# Patient Record
Sex: FEMALE | Race: WHITE | Employment: OTHER | URBAN - METROPOLITAN AREA
[De-identification: names, ages, dates, MRNs, and addresses within clinical notes are randomized per-mention and may not be internally consistent; named-entity substitution may affect disease eponyms.]

---

## 2019-04-02 ENCOUNTER — OFFICE VISIT (OUTPATIENT)
Dept: FAMILY MEDICINE CLINIC | Age: 39
End: 2019-04-02

## 2019-04-02 VITALS
RESPIRATION RATE: 14 BRPM | WEIGHT: 163 LBS | SYSTOLIC BLOOD PRESSURE: 122 MMHG | HEART RATE: 92 BPM | TEMPERATURE: 98.2 F | OXYGEN SATURATION: 98 % | BODY MASS INDEX: 27.83 KG/M2 | HEIGHT: 64 IN | DIASTOLIC BLOOD PRESSURE: 64 MMHG

## 2019-04-02 DIAGNOSIS — M47.22 OSTEOARTHRITIS OF SPINE WITH RADICULOPATHY, CERVICAL REGION: Primary | ICD-10-CM

## 2019-04-02 DIAGNOSIS — M25.562 CHRONIC PAIN OF BOTH KNEES: ICD-10-CM

## 2019-04-02 DIAGNOSIS — H81.03 MENIERE'S DISEASE OF BOTH EARS: ICD-10-CM

## 2019-04-02 DIAGNOSIS — M79.641 PAIN IN BOTH HANDS: ICD-10-CM

## 2019-04-02 DIAGNOSIS — Z98.890 H/O CERVICAL SPINE SURGERY: ICD-10-CM

## 2019-04-02 DIAGNOSIS — M25.561 CHRONIC PAIN OF BOTH KNEES: ICD-10-CM

## 2019-04-02 DIAGNOSIS — M79.642 PAIN IN BOTH HANDS: ICD-10-CM

## 2019-04-02 DIAGNOSIS — G89.29 CHRONIC PAIN OF BOTH KNEES: ICD-10-CM

## 2019-04-02 DIAGNOSIS — M25.50 ARTHRALGIA, UNSPECIFIED JOINT: ICD-10-CM

## 2019-04-02 RX ORDER — CYCLOBENZAPRINE HCL 10 MG
10 TABLET ORAL
Qty: 90 TAB | Refills: 0 | Status: SHIPPED | OUTPATIENT
Start: 2019-04-02 | End: 2019-07-01 | Stop reason: SDUPTHER

## 2019-04-02 RX ORDER — MELOXICAM 15 MG/1
15 TABLET ORAL DAILY
COMMUNITY
End: 2019-09-05 | Stop reason: SDUPTHER

## 2019-04-02 RX ORDER — GUAIFENESIN AND PHENYLEPHRINE HCL 400; 10 MG/1; MG/1
TABLET ORAL
COMMUNITY
End: 2020-01-21

## 2019-04-02 RX ORDER — CYCLOBENZAPRINE HCL 10 MG
10 TABLET ORAL
COMMUNITY
End: 2019-04-02 | Stop reason: SDUPTHER

## 2019-04-02 RX ORDER — OMEPRAZOLE 40 MG/1
40 CAPSULE, DELAYED RELEASE ORAL
COMMUNITY
End: 2020-07-08 | Stop reason: SDUPTHER

## 2019-04-02 NOTE — PROGRESS NOTES
Yvonne Rosenthal is a 44 y.o. 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    Orthopedic Consult     neck, back, bilat hands/wrist, right knee       Vitals:    19 1400   BP: 122/64   Pulse: 92   Resp: 14   Temp: 98.2 °F (36.8 °C)   TempSrc: Oral   SpO2: 98%   Weight: 163 lb (73.9 kg)   Height: 5' 3.5\" (1.613 m)       Hearing/Vision:   No exam data present    Learning Assessment:     Learning Assessment 2019   PRIMARY LEARNER Patient   HIGHEST LEVEL OF EDUCATION - PRIMARY LEARNER  2 YEARS OF COLLEGE   BARRIERS PRIMARY LEARNER NONE   PRIMARY LANGUAGE ENGLISH    NEED No   LEARNER PREFERENCE PRIMARY DEMONSTRATION   ANSWERED BY patient   RELATIONSHIP SELF     Depression Screening:     3 most recent PHQ Screens 2019   Little interest or pleasure in doing things Several days   Feeling down, depressed, irritable, or hopeless Several days   Total Score PHQ 2 2     Fall Risk Assessment:   No flowsheet data found. Abuse Screening:   No flowsheet data found. Coordination of Care Questionaire:   1. Have you been to the ER, urgent care clinic since your last visit? Hospitalized since your last visit? YES; [facility] [place] [facility]    2. Have you seen or consulted any other health care providers outside of the [address] [place] [address] since your last visit? Include any pap smears or colon screening. NO    Advanced Directive:   1. Do you have an Advanced Directive? NO    2. Would you like information on Advanced Directives?  NO

## 2019-04-04 NOTE — PROGRESS NOTES
Juan Chau is a 44 y.o.  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 She states numerous referrals needed. She states she is  and new to the area and was being seen by ortho, neuro, and rheum before her move for various conditions as listed below. No changes in condition just needs to establish care for follow ups. No new c/o today would like to get referrals and come back for CPE and labs. Current Outpatient Medications   Medication Sig Dispense Refill    meloxicam (MOBIC) 15 mg tablet Take 15 mg by mouth daily.  omeprazole (PRILOSEC) 40 mg capsule Take 40 mg by mouth daily.  turmeric root extract 500 mg cap Take  by mouth.  cyclobenzaprine (FLEXERIL) 10 mg tablet Take 1 Tab by mouth nightly. 90 Tab 0     No Known Allergies    Review of Systems   Constitutional: Negative for chills and fever. Respiratory: Negative for shortness of breath. Cardiovascular: Negative for chest pain and leg swelling. Gastrointestinal: Negative for abdominal pain, constipation, diarrhea, nausea and vomiting. Musculoskeletal: Positive for back pain, joint pain, myalgias and neck pain. Negative for falls. Neurological: Positive for dizziness. Negative for tingling, tremors, sensory change, speech change, focal weakness, weakness and headaches. Objective:  Vitals:    04/02/19 1400   BP: 122/64   Pulse: 92   Resp: 14   Temp: 98.2 °F (36.8 °C)   TempSrc: Oral   SpO2: 98%   Weight: 163 lb (73.9 kg)   Height: 5' 3.5\" (1.613 m)     General:   Well-groomed, well-nourished, in no distress, pleasant, alert, appropriate    Neck:      Neck supple, no swelling, mass or tenderness or thyromegaly  Ears:  TM's wnl no fluid or erythema  Cardiovasc:   RRR,  no murmur, rubs or gallops.    Pulmonary: Lungs clear bilaterally, no wheezing, rales or rhonchi. Abdomen:   Abdomen soft, normal bowel sounds. No masses, tenderness,    rebound/rigidity or CVA tenderness. No hepatosplenomegaly. Extremities:   No erythema, deformity, edema, or TTP,  LEs warm and well-perfused. Neuro:            Reflexes and motor strength normal and symmetric. No focal deficits. MSK:   Normal full range of motion of joints, although she states ttp when moved. 5/5 muscle strength throughout. Cervical spine fused with limited ROM. Assessment/Plan:      1. Osteoarthritis of spine with radiculopathy, cervical region  Ortho spine in past would like to see neuro. - REFERRAL TO NEUROLOGY     2. H/O cervical spine surgery  Has seen ortho spine in past would like to see neuro for second opinion   - REFERRAL TO NEUROLOGY    3. Meniere's disease of both ears  Patient request referral to neuro discussed ENT she declined. States she wants Neuro   - REFERRAL TO NEUROLOGY    4. Arthralgia, unspecified joint  Prior hx of joint problems was advised to see rheumatology prior lab work up negative.   - REFERRAL TO RHEUMATOLOGY    5. Pain in both hands  Ongoing pain bilateral hands has seen ortho in past request referral back   - REFERRAL TO ORTHOPEDICS    6. Chronic pain of both knees  Ongoing knee pain was seen by ortho in past wants to establish with ortho here. - REFERRAL TO ORTHOPEDICS    I have discussed the diagnosis with the patient and the intended plan as seen in the above orders. The patient has received an after-visit summary and questions were answered concerning future plans. I have discussed medication side effects and warnings with the patient as well. I have reviewed the plan of care with the patient, accepted their input and they are in agreement with the treatment goals. Follow-up and Dispositions    · Return in about 3 months (around 7/2/2019).        More than 1/2 of this 45 minute visit was spent in counselling and coordination of care, as described above.     Latisha Recinos FNP-BC

## 2019-05-02 ENCOUNTER — HOSPITAL ENCOUNTER (OUTPATIENT)
Dept: LAB | Age: 39
Discharge: HOME | End: 2019-05-02
Payer: OTHER GOVERNMENT

## 2019-05-02 ENCOUNTER — OFFICE VISIT (OUTPATIENT)
Dept: FAMILY MEDICINE CLINIC | Age: 39
End: 2019-05-02

## 2019-05-02 VITALS
HEIGHT: 64 IN | WEIGHT: 157.2 LBS | OXYGEN SATURATION: 98 % | DIASTOLIC BLOOD PRESSURE: 78 MMHG | SYSTOLIC BLOOD PRESSURE: 100 MMHG | BODY MASS INDEX: 26.84 KG/M2 | HEART RATE: 96 BPM | RESPIRATION RATE: 17 BRPM | TEMPERATURE: 98 F

## 2019-05-02 DIAGNOSIS — Z01.419 ROUTINE GYNECOLOGICAL EXAMINATION: Primary | ICD-10-CM

## 2019-05-02 DIAGNOSIS — Z01.419 ROUTINE GYNECOLOGICAL EXAMINATION: ICD-10-CM

## 2019-05-02 PROBLEM — M25.561 CHRONIC PAIN OF BOTH KNEES: Status: ACTIVE | Noted: 2019-05-02

## 2019-05-02 PROBLEM — M79.642 PAIN IN BOTH HANDS: Status: ACTIVE | Noted: 2019-05-02

## 2019-05-02 PROBLEM — M25.50 ARTHRALGIA: Status: ACTIVE | Noted: 2019-05-02

## 2019-05-02 PROBLEM — M25.562 CHRONIC PAIN OF BOTH KNEES: Status: ACTIVE | Noted: 2019-05-02

## 2019-05-02 PROBLEM — Z98.890 H/O CERVICAL SPINE SURGERY: Status: ACTIVE | Noted: 2019-05-02

## 2019-05-02 PROBLEM — H81.03 MENIERE'S DISEASE OF BOTH EARS: Status: ACTIVE | Noted: 2019-05-02

## 2019-05-02 PROBLEM — M47.22 OSTEOARTHRITIS OF SPINE WITH RADICULOPATHY, CERVICAL REGION: Status: ACTIVE | Noted: 2019-05-02

## 2019-05-02 PROBLEM — M79.641 PAIN IN BOTH HANDS: Status: ACTIVE | Noted: 2019-05-02

## 2019-05-02 PROBLEM — G89.29 CHRONIC PAIN OF BOTH KNEES: Status: ACTIVE | Noted: 2019-05-02

## 2019-05-02 PROCEDURE — 87624 HPV HI-RISK TYP POOLED RSLT: CPT

## 2019-05-02 PROCEDURE — 87798 DETECT AGENT NOS DNA AMP: CPT

## 2019-05-02 PROCEDURE — 88142 CYTOPATH C/V THIN LAYER: CPT

## 2019-05-02 NOTE — PROGRESS NOTES
Subjective:   44 y.o. female for Well Woman Check. Patient's last menstrual period was 04/25/2019. Social History: single partner, contraception - none. Pertinent past medical hstory: no history of HTN, DVT, CAD, DM, liver disease, migraines or smoking. Current Outpatient Medications   Medication Sig Dispense Refill    meloxicam (MOBIC) 15 mg tablet Take 15 mg by mouth daily.  omeprazole (PRILOSEC) 40 mg capsule Take 40 mg by mouth daily.  turmeric root extract 500 mg cap Take  by mouth.  cyclobenzaprine (FLEXERIL) 10 mg tablet Take 1 Tab by mouth nightly. 90 Tab 0     No Known Allergies     ROS:  Feeling well. No dyspnea or chest pain on exertion. No abdominal pain, change in bowel habits, black or bloody stools. No urinary tract symptoms. GYN ROS: normal menses, no abnormal bleeding, pelvic pain or discharge, no breast pain or new or enlarging lumps on self exam. No neurological complaints. Objective:     Visit Vitals  /78 (BP 1 Location: Left arm, BP Patient Position: Sitting)   Pulse 96   Temp 98 °F (36.7 °C) (Oral)   Resp 17   Ht 5' 4.25\" (1.632 m)   Wt 157 lb 3.2 oz (71.3 kg)   LMP 04/25/2019   SpO2 98%   BMI 26.77 kg/m²     The patient appears well, alert, oriented x 3, in no distress. ENT normal.  Neck supple. No adenopathy or thyromegaly. RUDDY. Lungs are clear, good air entry, no wheezes, rhonchi or rales. S1 and S2 normal, no murmurs, regular rate and rhythm. Abdomen soft without tenderness, guarding, mass or organomegaly. Extremities show no edema, normal peripheral pulses. Neurological is normal, no focal findings.     BREAST EXAM: breasts appear normal, no suspicious masses, no skin or nipple changes or axillary nodes    PELVIC EXAM: VULVA: normal appearing vulva with no masses, tenderness or lesions, VAGINA: normal appearing vagina with normal color and discharge, no lesions, CERVIX: normal appearing cervix without discharge or lesions, UTERUS: uterus is normal size, shape, consistency and nontender, ADNEXA: normal adnexa in size, nontender and no masses, RECTAL: normal rectal, no masses    Assessment/Plan:   well woman  pap smear  return annually or prn  Encounter Diagnoses   Name Primary?  Routine gynecological examination Yes     Orders Placed This Encounter    NUSWAB VAGINITIS PLUS    PAP + HPV DNA (HIGH RISK)   .

## 2019-05-02 NOTE — PROGRESS NOTES
Jm Marinelli is a 44 y.o. female (: 1980) presenting to address:    Chief Complaint   Patient presents with    Well Woman       Vitals:    19 1408   BP: 100/78   Pulse: 96   Resp: 17   Temp: 98 °F (36.7 °C)   TempSrc: Oral   SpO2: 98%   Weight: 157 lb 3.2 oz (71.3 kg)   Height: 5' 4.25\" (1.632 m)   PainSc:   0 - No pain   LMP: 2019       Hearing/Vision:   No exam data present    Learning Assessment:     Learning Assessment 2019   PRIMARY LEARNER Patient   HIGHEST LEVEL OF EDUCATION - PRIMARY LEARNER  2 YEARS OF COLLEGE   BARRIERS PRIMARY LEARNER NONE   PRIMARY LANGUAGE ENGLISH    NEED No   LEARNER PREFERENCE PRIMARY DEMONSTRATION   ANSWERED BY patient   RELATIONSHIP SELF     Depression Screening:     3 most recent PHQ Screens 2019   Little interest or pleasure in doing things Not at all   Feeling down, depressed, irritable, or hopeless Not at all   Total Score PHQ 2 0     Fall Risk Assessment:   No flowsheet data found. Abuse Screening:     Abuse Screening Questionnaire 2019   Do you ever feel afraid of your partner? N   Are you in a relationship with someone who physically or mentally threatens you? N   Is it safe for you to go home? Y     Coordination of Care Questionaire:   1. Have you been to the ER, urgent care clinic since your last visit? Hospitalized since your last visit? No     2. Have you seen or consulted any other health care providers outside of the [address] [place] [address] since your last visit? Include any pap smears or colon screening. No, needs eye exam and has appt for ortho spine dr   Advanced Directive:   1. Do you have an Advanced Directive? No   2. Would you like information on Advanced Directives? Yes     Health Maintenance Due   Topic Date Due    Pneumococcal 0-64 years (1 of 1 - PPSV23) 1986    DTaP/Tdap/Td series (1 - Tdap) 2001    PAP AKA CERVICAL CYTOLOGY  2001     Pt requests new referral to a new rheum.

## 2019-05-06 LAB
A VAGINAE DNA VAG QL NAA+PROBE: NORMAL SCORE
BVAB2 DNA VAG QL NAA+PROBE: NORMAL SCORE
C ALBICANS DNA VAG QL NAA+PROBE: NEGATIVE
C GLABRATA DNA VAG QL NAA+PROBE: NEGATIVE
C TRACH RRNA SPEC QL NAA+PROBE: NEGATIVE
MEGA1 DNA VAG QL NAA+PROBE: NORMAL SCORE
N GONORRHOEA RRNA SPEC QL NAA+PROBE: NEGATIVE
SPECIMEN SOURCE: NORMAL
T VAGINALIS RRNA SPEC QL NAA+PROBE: NEGATIVE

## 2019-07-01 RX ORDER — CYCLOBENZAPRINE HCL 10 MG
10 TABLET ORAL
Qty: 90 TAB | Refills: 0 | Status: SHIPPED | OUTPATIENT
Start: 2019-07-01 | End: 2019-10-01 | Stop reason: SDUPTHER

## 2019-07-01 NOTE — TELEPHONE ENCOUNTER
Requested Prescriptions     Pending Prescriptions Disp Refills    cyclobenzaprine (FLEXERIL) 10 mg tablet 90 Tab 0     Sig: Take 1 Tab by mouth nightly. Patient calling to request refill on: 7.1.19      Remaining pills:   Last appt: 5.2.19  Next appt: none     Pharmacy: [facility] [address]      Pt is requesting for the medication to be sent in today if at all possible because she will be leaving for [place] tomorrow and will run out of medication while she is away. I did inform her that I could not guarantee that it would be today and she voiced understanding. Pt is requesting a call later this afternoon to update her on if her medication can be called in today or not.

## 2019-09-05 NOTE — TELEPHONE ENCOUNTER
Requested Prescriptions     Pending Prescriptions Disp Refills    meloxicam (MOBIC) 15 mg tablet       Sig: Take 1 Tab by mouth daily. Patient calling to request refill on: 9.5.19      Remaining pills: 6  Last appt: 5.2.19  Next appt: 9.9.19    Pharmacy: [address]      Patient aware of 72 hour time frame.

## 2019-09-06 RX ORDER — MELOXICAM 15 MG/1
15 TABLET ORAL DAILY
Qty: 90 TAB | Refills: 0 | Status: SHIPPED | OUTPATIENT
Start: 2019-09-06 | End: 2020-02-20

## 2019-09-09 ENCOUNTER — OFFICE VISIT (OUTPATIENT)
Dept: FAMILY MEDICINE CLINIC | Age: 39
End: 2019-09-09

## 2019-09-09 VITALS
SYSTOLIC BLOOD PRESSURE: 118 MMHG | OXYGEN SATURATION: 99 % | DIASTOLIC BLOOD PRESSURE: 83 MMHG | WEIGHT: 157.4 LBS | BODY MASS INDEX: 26.87 KG/M2 | RESPIRATION RATE: 18 BRPM | TEMPERATURE: 97.1 F | HEART RATE: 76 BPM | HEIGHT: 64 IN

## 2019-09-09 DIAGNOSIS — R53.82 CHRONIC FATIGUE: Primary | ICD-10-CM

## 2019-09-09 DIAGNOSIS — H81.03 MENIERE'S DISEASE OF BOTH EARS: ICD-10-CM

## 2019-09-09 DIAGNOSIS — R06.02 SOB (SHORTNESS OF BREATH): ICD-10-CM

## 2019-09-09 NOTE — PATIENT INSTRUCTIONS
Shortness of Breath: Care Instructions  Your Care Instructions  Shortness of breath has many causes. Sometimes conditions such as anxiety can lead to shortness of breath. Some people get mild shortness of breath when they exercise. Trouble breathing also can be a symptom of a serious problem, such as asthma, lung disease, emphysema, heart problems, and pneumonia. If your shortness of breath continues, you may need tests and treatment. Watch for any changes in your breathing and other symptoms. Follow-up care is a key part of your treatment and safety. Be sure to make and go to all appointments, and call your doctor if you are having problems. It's also a good idea to know your test results and keep a list of the medicines you take. How can you care for yourself at home? · Do not smoke or allow others to smoke around you. If you need help quitting, talk to your doctor about stop-smoking programs and medicines. These can increase your chances of quitting for good. · Get plenty of rest and sleep. · Take your medicines exactly as prescribed. Call your doctor if you think you are having a problem with your medicine. · Find healthy ways to deal with stress. ? Exercise daily. ? Get plenty of sleep. ? Eat regularly and well. When should you call for help? Call 911 anytime you think you may need emergency care. For example, call if:    · You have severe shortness of breath.     · You have symptoms of a heart attack. These may include:  ? Chest pain or pressure, or a strange feeling in the chest.  ? Sweating. ? Shortness of breath. ? Nausea or vomiting. ? Pain, pressure, or a strange feeling in the back, neck, jaw, or upper belly or in one or both shoulders or arms. ? Lightheadedness or sudden weakness. ? A fast or irregular heartbeat. After you call 911, the  may tell you to chew 1 adult-strength or 2 to 4 low-dose aspirin. Wait for an ambulance.  Do not try to drive yourself.    Call your doctor now or seek immediate medical care if:    · Your shortness of breath gets worse or you start to wheeze. Wheezing is a high-pitched sound when you breathe.     · You wake up at night out of breath or have to prop your head up on several pillows to breathe.     · You are short of breath after only light activity or while at rest.    Watch closely for changes in your health, and be sure to contact your doctor if:    · You do not get better over the next 1 to 2 days. Where can you learn more? Go to http://bar-glenda.info/. Enter S780 in the search box to learn more about \"Shortness of Breath: Care Instructions. \"  Current as of: September 5, 2018  Content Version: 12.1  © 5140-7614 [employer], [employer]. Care instructions adapted under license by [employer] (which disclaims liability or warranty for this information). If you have questions about a medical condition or this instruction, always ask your healthcare professional. [address] any warranty or liability for your use of this information.

## 2019-09-15 NOTE — PROGRESS NOTES
Jamison Singleton is a 44 y.o.  female and presents with    Chief Complaint   Patient presents with    Shortness of Breath    Fatigue         Subjective:  Patient presents with c/o new onset sob with walking and increased fatigue in past 2-3 weeks. Patient states she also had an ED visit in dec 2018 that she states was never resolved. She states she was supposed to see Neuro and have MRI however when she went to neuro they did not think she needed further work up. Patient is concerned she has an undiagnosed issue but she is vague with her symptoms. She had a clear CT in 2018, and labs have all been fine. Patient also with hx of meniere's and did not see ENT. Would like referral today. Current Outpatient Medications   Medication Sig Dispense Refill    meloxicam (MOBIC) 15 mg tablet Take 1 Tab by mouth daily. 90 Tab 0    cyclobenzaprine (FLEXERIL) 10 mg tablet Take 1 Tab by mouth nightly. 90 Tab 0    omeprazole (PRILOSEC) 40 mg capsule Take 40 mg by mouth daily.  turmeric root extract 500 mg cap Take  by mouth. No Known Allergies    Review of Systems   Constitutional: Positive for malaise/fatigue. Negative for chills and fever. Eyes: Negative for blurred vision. Respiratory: Positive for shortness of breath. Negative for cough, hemoptysis, sputum production and wheezing. Cardiovascular: Negative for chest pain and leg swelling. Gastrointestinal: Negative for abdominal pain, constipation, diarrhea, nausea and vomiting. Genitourinary: Negative. Musculoskeletal: Negative. Skin: Negative. Neurological: Negative for headaches.          Objective:  Vitals:    09/09/19 1338   BP: 118/83   Pulse: 76   Resp: 18   Temp: 97.1 °F (36.2 °C)   TempSrc: Oral   SpO2: 99%   Weight: 157 lb 6.4 oz (71.4 kg)   Height: 5' 4.25\" (1.632 m)   PF: 450 L/min   PainSc:   0 - No pain   LMP: 08/26/2019     General:   Well-groomed, well-nourished, in no distress, pleasant, alert, appropriate    Cardiovasc:   RRR, no murmur, rubs or gallops. Pulmonary:    Lungs clear bilaterally, no wheezing, rales or rhonchi. Abdomen:   Abdomen soft, normal bowel sounds. No masses, tenderness,    rebound/rigidity or CVA tenderness. No hepatosplenomegaly. Extremities:   No erythema, deformity, edema, or TTP,  LEs warm and well-perfused. Neuro:            Reflexes and motor strength normal and symmetric. No focal deficits. Assessment/Plan:      1. SOB (shortness of breath)  At request cxr today and ekg  - XR CHEST PA LAT; Future  - AMB POC EKG ROUTINE W/ 12 LEADS, INTER & REP    2. Chronic fatigue  - XR CHEST PA LAT; Future  - AMB POC EKG ROUTINE W/ 12 LEADS, INTER & REP    3. Meniere's disease of both ears  - REFERRAL TO ENT-OTOLARYNGOLOGY    Will call patient with results reassurance provided on CT from last year advised she can request second opinion with Neuro, all vitals are wnl today. Discussed allergy season hx of georgiana and possible need for tx plan, she declines today states that allergies are not the problem. I have discussed the diagnosis with the patient and the intended plan as seen in the above orders. The patient has received an after-visit summary and questions were answered concerning future plans. I have discussed medication side effects and warnings with the patient as well. I have reviewed the plan of care with the patient, accepted their input and they are in agreement with the treatment goals. More than 1/2 of this 15 minute visit was spent face to face in counselling and coordination of care, as described above. This document may have been created with the aid of dictation software. Text may contain errors, particularly phonetic errors.      Anuj Rodriguez [facility]

## 2019-09-30 RX ORDER — CYCLOBENZAPRINE HCL 10 MG
10 TABLET ORAL
Qty: 90 TAB | Refills: 0 | Status: CANCELLED | OUTPATIENT
Start: 2019-09-30

## 2019-09-30 NOTE — TELEPHONE ENCOUNTER
Requested Prescriptions     Pending Prescriptions Disp Refills    cyclobenzaprine (FLEXERIL) 10 mg tablet 90 Tab 0     Sig: Take 1 Tab by mouth nightly. Patient calling to request refill on: 9.30.19      Remaining pills:   Last appt: 9/9/19  Next appt: none     Pharmacy: [address]       Patient aware of 72 hour time frame. Pt will be leaving this Saturday to be out of town for 2x weeks and will run out before she will be back. Requesting a call when the medication has been sent.

## 2019-10-01 ENCOUNTER — OFFICE VISIT (OUTPATIENT)
Dept: FAMILY MEDICINE CLINIC | Age: 39
End: 2019-10-01

## 2019-10-01 VITALS
WEIGHT: 159 LBS | HEART RATE: 97 BPM | HEIGHT: 64 IN | BODY MASS INDEX: 27.14 KG/M2 | RESPIRATION RATE: 17 BRPM | TEMPERATURE: 97.1 F | SYSTOLIC BLOOD PRESSURE: 112 MMHG | OXYGEN SATURATION: 99 % | DIASTOLIC BLOOD PRESSURE: 77 MMHG

## 2019-10-01 DIAGNOSIS — H10.31 ACUTE CONJUNCTIVITIS OF RIGHT EYE, UNSPECIFIED ACUTE CONJUNCTIVITIS TYPE: Primary | ICD-10-CM

## 2019-10-01 RX ORDER — OFLOXACIN 3 MG/ML
2 SOLUTION/ DROPS OPHTHALMIC 4 TIMES DAILY
Qty: 5 ML | Refills: 0 | Status: SHIPPED | OUTPATIENT
Start: 2019-10-01 | End: 2019-10-06

## 2019-10-01 RX ORDER — CYCLOBENZAPRINE HCL 10 MG
10 TABLET ORAL
Qty: 30 TAB | Refills: 0 | Status: SHIPPED | OUTPATIENT
Start: 2019-10-01 | End: 2019-11-04 | Stop reason: SDUPTHER

## 2019-10-01 NOTE — PROGRESS NOTES
Chief Complaint   Patient presents with    Eye Burn    Itchy Eye       Assessment/Plan  1. Acute conjunctivitis of right eye, unspecified acute conjunctivitis type  -will treat with ofloxacin. Could be allergic conjunctivitis. Advised to avoid contact lens use til cleared. - ofloxacin (FLOXIN) 0.3 % ophthalmic solution; Administer 2 Drops to both eyes four (4) times daily for 5 days. Dispense: 5 mL; Refill: 0      The plan was discussed with the patient. The patient verbalized understanding and is in agreement with the plan. All medication potential side effects were discussed with the patient. SUBJECTIVE:   Gloria Saenz is a 44 y.o. female patient of TAMMY Sheets who complains of 1 week h/o L eye irritation and redness. +itchy. In morning there is crusting. No h/o allergies. No eye pain. No foreign body sensation. Wears (monthly) contacts and continues to wear them. No recent URI. Review of Systems - ENT ROS: negative  Respiratory ROS: no cough, shortness of breath, or wheezing  Cardiovascular ROS: no chest pain or dyspnea on exertion  Gastrointestinal ROS: no abdominal pain, change in bowel habits, or black or bloody stools  negative  Musculoskeletal ROS: negative  Neurological ROS: negative    Physical Examination:   Visit Vitals  /77 (BP 1 Location: Left arm, BP Patient Position: Sitting)   Pulse 97   Temp 97.1 °F (36.2 °C) (Oral)   Resp 17   Ht 5' 4\" (1.626 m)   Wt 159 lb (72.1 kg)   LMP 09/17/2019   SpO2 99%   BMI 27.29 kg/m²       Constitutional: Well developed, nourished, no distress, alert   HENT: Exterior ears and tympanic membranes normal bilaterally. Supple neck. No thyromegaly or lymphadenopathy. Oropharynx clear and moist mucous membranes. Eyes: Conjunctiva normal. PERRL. CV: S1, S2.  RRR. No murmurs/rubs. Pulm: No abnormalities on inspection. Clear to auscultation bilaterally. No wheezing/rhonchi. Normal effort.              Tad Obrien MD

## 2019-10-01 NOTE — PROGRESS NOTES
Paige Euceda is a 44 y.o. female presents to office for left eye itchy, red and burns for about a week    Medication list has been reviewed with Paige Euceda and updated as of today's date     Health Maintenance items with a due date reviewed with patient:  Health Maintenance Due   Topic Date Due    Pneumococcal 0-64 years (1 of 1 - PPSV23) 02/14/1986    DTaP/Tdap/Td series (1 - Tdap) 02/14/2001    Influenza Age 9 to Adult  08/01/2019

## 2019-10-03 RX ORDER — CYCLOBENZAPRINE HCL 10 MG
10 TABLET ORAL
Qty: 30 TAB | Refills: 0 | Status: SHIPPED | OUTPATIENT
Start: 2019-10-03 | End: 2019-12-06 | Stop reason: ALTCHOICE

## 2019-11-04 NOTE — TELEPHONE ENCOUNTER
Requested Prescriptions     Pending Prescriptions Disp Refills    cyclobenzaprine (FLEXERIL) 10 mg tablet 30 Tab 0     Sig: Take 1 Tab by mouth nightly.

## 2019-11-05 RX ORDER — CYCLOBENZAPRINE HCL 10 MG
10 TABLET ORAL
Qty: 30 TAB | Refills: 0 | Status: SHIPPED | OUTPATIENT
Start: 2019-11-05 | End: 2019-12-06 | Stop reason: ALTCHOICE

## 2019-12-06 ENCOUNTER — OFFICE VISIT (OUTPATIENT)
Dept: FAMILY MEDICINE CLINIC | Age: 39
End: 2019-12-06

## 2019-12-06 VITALS
DIASTOLIC BLOOD PRESSURE: 88 MMHG | TEMPERATURE: 97.5 F | HEART RATE: 87 BPM | RESPIRATION RATE: 18 BRPM | BODY MASS INDEX: 27.29 KG/M2 | HEIGHT: 64 IN | SYSTOLIC BLOOD PRESSURE: 121 MMHG | OXYGEN SATURATION: 92 %

## 2019-12-06 DIAGNOSIS — H10.31 ACUTE CONJUNCTIVITIS OF RIGHT EYE, UNSPECIFIED ACUTE CONJUNCTIVITIS TYPE: Primary | ICD-10-CM

## 2019-12-06 DIAGNOSIS — H04.301 INFECTION OF RIGHT TEAR DUCT: ICD-10-CM

## 2019-12-06 DIAGNOSIS — F51.01 PRIMARY INSOMNIA: ICD-10-CM

## 2019-12-06 RX ORDER — DOXEPIN HYDROCHLORIDE 25 MG/1
50 CAPSULE ORAL
Qty: 60 CAP | Refills: 2 | Status: SHIPPED | OUTPATIENT
Start: 2019-12-06 | End: 2020-03-20 | Stop reason: SDUPTHER

## 2019-12-06 RX ORDER — POLYMYXIN B SULFATE AND TRIMETHOPRIM 1; 10000 MG/ML; [USP'U]/ML
1 SOLUTION OPHTHALMIC EVERY 4 HOURS
Qty: 1 BOTTLE | Refills: 0 | Status: SHIPPED | OUTPATIENT
Start: 2019-12-06 | End: 2019-12-11

## 2019-12-06 RX ORDER — DOXEPIN HYDROCHLORIDE 25 MG/1
2 CAPSULE ORAL
COMMUNITY
End: 2019-12-06 | Stop reason: SDUPTHER

## 2019-12-06 RX ORDER — CYCLOBENZAPRINE HCL 10 MG
10 TABLET ORAL
Qty: 30 TAB | Refills: 2 | Status: SHIPPED | OUTPATIENT
Start: 2019-12-06 | End: 2020-01-07 | Stop reason: SDUPTHER

## 2019-12-06 NOTE — PROGRESS NOTES
Maeve Jones is a 44 y.o. female (: 1980) presenting to address:    Chief Complaint   Patient presents with   [address]    Referral Request    Medication Refill       Vitals:    19 1458   BP: 121/88   Pulse: 87   Resp: 18   Temp: 97.5 °F (36.4 °C)   TempSrc: Oral   SpO2: 92%   Height: 5' 4\" (1.626 m)   PainSc:   0 - No pain   LMP: 2019       Hearing/Vision:   No exam data present    Learning Assessment:     Learning Assessment 2019   PRIMARY LEARNER Patient   HIGHEST LEVEL OF EDUCATION - PRIMARY LEARNER  2 YEARS OF COLLEGE   BARRIERS PRIMARY LEARNER NONE   PRIMARY LANGUAGE ENGLISH    NEED No   LEARNER PREFERENCE PRIMARY DEMONSTRATION   ANSWERED BY patient   RELATIONSHIP SELF     Depression Screening:     3 most recent PHQ Screens 2019   Little interest or pleasure in doing things Several days   Feeling down, depressed, irritable, or hopeless Several days   Total Score PHQ 2 2     Fall Risk Assessment:   No flowsheet data found. Abuse Screening:     Abuse Screening Questionnaire 2019   Do you ever feel afraid of your partner? N   Are you in a relationship with someone who physically or mentally threatens you? N   Is it safe for you to go home? Y     Coordination of Care Questionaire:   1. Have you been to the ER, urgent care clinic since your last visit? Hospitalized since your last visit? NO    2. Have you seen or consulted any other health care providers outside of the [address] [place] [address] since your last visit? Include any pap smears or colon screening. NO    Advanced Directive:   1. Do you have an Advanced Directive? NO    2. Would you like information on Advanced Directives?  NO

## 2019-12-18 NOTE — PROGRESS NOTES
Subjective:   Paige Euceda is a 44 y.o. female who presents for evaluation of redness. She has noticed the above symptoms in the right eye for several days. Onset was sudden. Symptoms have included discharge, itching. Patient denies blurred vision, visual field deficit, photophobia. There is a history of contact lens use. Patient also has tear duct issue and needs referral to ophthalmology as her tear duct gets clogged. Other concerns patient needing refill for her insomnia medication    Review of Systems  wears contact lenses    Objective:     Visit Vitals  /88 (BP 1 Location: Right arm, BP Patient Position: Sitting)   Pulse 87   Temp 97.5 °F (36.4 °C) (Oral)   Resp 18   Ht 5' 4\" (1.626 m)   LMP 11/13/2019   SpO2 92%   BMI 27.29 kg/m²                 General: alert, cooperative, mild distress, appears stated age   Eyes:  positive findings: conjunctivae: 1+ bacterial conjunctivitis   Vision: Not performed   Fluorescein:  not done     Assessment/Plan:     acute conjunctivitis    1. Discussed the diagnosis and proper care of conjunctivitis. Stressed household hygiene. 2. Ophthalmic drops per orders. 3. Antibiotics per orders. 4. Warm compress to eye(s). 5. Local eye care discussed. 6. Analgesics as needed. 7. Patient instructions: contagiousness precautions  8. Risks and side effects of medications was discussed. 9. Pt advised to read written information provided by the pharmacist: yes  10. Followup as needed. Encounter Diagnoses   Name Primary?  Acute conjunctivitis of right eye, unspecified acute conjunctivitis type Yes    Primary insomnia     Infection of right tear duct      Orders Placed This Encounter    REFERRAL TO OPHTHALMOLOGY    DISCONTD: doxepin (SINEQUAN) 25 mg capsule    cyclobenzaprine (FLEXERIL) 10 mg tablet    doxepin (SINEQUAN) 25 mg capsule    trimethoprim-polymyxin b (POLYTRIM) ophthalmic solution   .   Medications refilled, Polytrim for conjunctivitis, ophthalmology referral for tear duct issue.   rtc as needed

## 2020-01-07 RX ORDER — CYCLOBENZAPRINE HCL 10 MG
10 TABLET ORAL
Qty: 30 TAB | Refills: 2 | Status: SHIPPED | OUTPATIENT
Start: 2020-01-07 | End: 2020-03-06 | Stop reason: SDUPTHER

## 2020-01-07 NOTE — TELEPHONE ENCOUNTER
Patient request medication refill. Requested Prescriptions     Pending Prescriptions Disp Refills    cyclobenzaprine (FLEXERIL) 10 mg tablet 30 Tab 2     Sig: Take 1 Tab by mouth three (3) times daily as needed for Muscle Spasm(s).

## 2020-01-21 ENCOUNTER — OFFICE VISIT (OUTPATIENT)
Dept: FAMILY MEDICINE CLINIC | Age: 40
End: 2020-01-21

## 2020-01-21 VITALS
TEMPERATURE: 97.9 F | SYSTOLIC BLOOD PRESSURE: 110 MMHG | RESPIRATION RATE: 16 BRPM | OXYGEN SATURATION: 97 % | HEART RATE: 92 BPM | HEIGHT: 64 IN | DIASTOLIC BLOOD PRESSURE: 79 MMHG | BODY MASS INDEX: 27.29 KG/M2

## 2020-01-21 DIAGNOSIS — Z98.890 H/O CERVICAL SPINE SURGERY: ICD-10-CM

## 2020-01-21 DIAGNOSIS — R23.2 HOT FLASHES: ICD-10-CM

## 2020-01-21 DIAGNOSIS — Z23 ENCOUNTER FOR IMMUNIZATION: ICD-10-CM

## 2020-01-21 DIAGNOSIS — Z13.21 ENCOUNTER FOR VITAMIN DEFICIENCY SCREENING: ICD-10-CM

## 2020-01-21 DIAGNOSIS — M79.641 PAIN IN BOTH HANDS: ICD-10-CM

## 2020-01-21 DIAGNOSIS — R10.11 RUQ PAIN: ICD-10-CM

## 2020-01-21 DIAGNOSIS — M47.22 OSTEOARTHRITIS OF SPINE WITH RADICULOPATHY, CERVICAL REGION: ICD-10-CM

## 2020-01-21 DIAGNOSIS — Z98.890 S/P DISKECTOMY: ICD-10-CM

## 2020-01-21 DIAGNOSIS — Z00.00 ROUTINE GENERAL MEDICAL EXAMINATION AT A HEALTH CARE FACILITY: Primary | ICD-10-CM

## 2020-01-21 DIAGNOSIS — M79.642 PAIN IN BOTH HANDS: ICD-10-CM

## 2020-01-21 PROBLEM — M54.9 CHRONIC BACK PAIN: Status: ACTIVE | Noted: 2020-01-21

## 2020-01-21 PROBLEM — G89.29 CHRONIC BACK PAIN: Status: ACTIVE | Noted: 2020-01-21

## 2020-01-21 NOTE — PROGRESS NOTES
Yvonne Rosenthal is a 44 y.o. female (: 1980) presenting to address:  Patient declines influenza vaccine at this time. Chief Complaint   Patient presents with    Transfer Of Care       Vitals:    20 1300   BP: 110/79   Pulse: 92   Resp: 16   Temp: 97.9 °F (36.6 °C)   TempSrc: Oral   SpO2: 97%   Height: 5' 4\" (1.626 m)   PainSc:   3   PainLoc: Rib Cage   LMP: 2019       Hearing/Vision:   No exam data present    Learning Assessment:     Learning Assessment 2019   PRIMARY LEARNER Patient   HIGHEST LEVEL OF EDUCATION - PRIMARY LEARNER  2 YEARS OF COLLEGE   BARRIERS PRIMARY LEARNER NONE   PRIMARY LANGUAGE ENGLISH    NEED No   LEARNER PREFERENCE PRIMARY DEMONSTRATION   ANSWERED BY patient   RELATIONSHIP SELF     Depression Screening:     3 most recent PHQ Screens 2020   PHQ Not Done Active Diagnosis of Depression or Bipolar Disorder   Little interest or pleasure in doing things -   Feeling down, depressed, irritable, or hopeless -   Total Score PHQ 2 -     Fall Risk Assessment:   No flowsheet data found. Abuse Screening:     Abuse Screening Questionnaire 2019   Do you ever feel afraid of your partner? N   Are you in a relationship with someone who physically or mentally threatens you? N   Is it safe for you to go home? Y     Coordination of Care Questionaire:   1. Have you been to the ER, urgent care clinic since your last visit? Hospitalized since your last visit? NO    2. Have you seen or consulted any other health care providers outside of the [address] [place] [address] since your last visit? Include any pap smears or colon screening. YES  Ophthalmologist about two weeks ago for lacrimal stenosis. Advanced Directive:   1. Do you have an Advanced Directive? NO    2. Would you like information on Advanced Directives?  NO

## 2020-01-21 NOTE — PROGRESS NOTES
HISTORY OF PRESENT ILLNESS  Dylan Beckford is a 44 y.o. female. HPI  New pt to me, chart reviewed today  S/p Diskectomy C5-6 years ago, has right side cervical radiculopathy, seen by Neurology last May 2019, she was referred to PT and given Rx to start effexor but she dis not take it, stated that she \"does not wants any more meds\", she is only using flexeril qhs now, ants refer to Orthopedics  C/o B/L hands pain, chronic, referred to Dr Kaylene Roman last year but not seen !, wants new referral placed  Seen by ENT for her Meniere's disease, stated that they ordered MRI of the head  C/o hot flashes on/off, wants ref to Gynecology  C/o RUQ abd pain on/off, she had cholecystectomy years ago    Review of Systems   Constitutional: Negative for chills and fever. HENT: Negative for ear pain. Gastrointestinal: Negative for nausea and vomiting. Musculoskeletal: Positive for back pain and neck pain. Negative for myalgias. Neurological: Negative for focal weakness and headaches. Physical Exam  Vitals signs reviewed. Constitutional:       General: She is not in acute distress. Appearance: She is well-developed. HENT:      Head: Normocephalic and atraumatic. Right Ear: Tympanic membrane normal.      Left Ear: Tympanic membrane normal.   Eyes:      Pupils: Pupils are equal, round, and reactive to light. Neck:      Musculoskeletal: Normal range of motion and neck supple. Thyroid: No thyromegaly. Vascular: No JVD. Cardiovascular:      Rate and Rhythm: Normal rate and regular rhythm. Heart sounds: Normal heart sounds. No murmur. Pulmonary:      Effort: Pulmonary effort is normal.      Breath sounds: Normal breath sounds. No wheezing or rales. Abdominal:      General: Bowel sounds are normal.      Palpations: Abdomen is soft. There is no mass. Tenderness: There is tenderness (minimal TTP) in the right upper quadrant. There is no guarding or rebound. Negative signs include Guerrero's sign. Musculoskeletal:      Comments: Spine and hands TTP   Lymphadenopathy:      Cervical: No cervical adenopathy. Skin:     General: Skin is warm and dry. Findings: No rash. Neurological:      Mental Status: She is alert and oriented to person, place, and time. Psychiatric:         Behavior: Behavior normal.         Judgment: Judgment normal.         ASSESSMENT and PLAN  Diagnoses and all orders for this visit:    1. Routine general medical examination at a health care facility  -     LIPID PANEL; Future  -     CBC WITH AUTOMATED DIFF; Future  -     METABOLIC PANEL, COMPREHENSIVE; Future  -     TSH 3RD GENERATION; Future  -     HEMOGLOBIN A1C WITH EAG; Future    2. Encounter for vitamin deficiency screening  -     VITAMIN D, 25 HYDROXY; Future    3. Encounter for immunization    4. H/O cervical spine surgery  -     REFERRAL TO ORTHOPEDICS    5. Osteoarthritis of spine with radiculopathy, cervical region  -     REFERRAL TO ORTHOPEDICS    6. Pain in both hands  -     REFERRAL TO ORTHOPEDICS    7. S/P diskectomy  -     REFERRAL TO ORTHOPEDICS    8. Hot flashes  -     REFERRAL TO GYNECOLOGY    9. RUQ pain  -     US ABD LTD;  Future

## 2020-01-22 DIAGNOSIS — E78.2 MIXED HYPERLIPIDEMIA: Primary | ICD-10-CM

## 2020-01-22 DIAGNOSIS — G89.29 CHRONIC BACK PAIN, UNSPECIFIED BACK LOCATION, UNSPECIFIED BACK PAIN LATERALITY: ICD-10-CM

## 2020-01-22 DIAGNOSIS — E55.9 VITAMIN D DEFICIENCY: ICD-10-CM

## 2020-01-22 DIAGNOSIS — Z98.890 S/P DISKECTOMY: ICD-10-CM

## 2020-01-22 DIAGNOSIS — M47.22 OSTEOARTHRITIS OF SPINE WITH RADICULOPATHY, CERVICAL REGION: Primary | ICD-10-CM

## 2020-01-22 DIAGNOSIS — M54.9 CHRONIC BACK PAIN, UNSPECIFIED BACK LOCATION, UNSPECIFIED BACK PAIN LATERALITY: ICD-10-CM

## 2020-01-22 LAB
25(OH)D3+25(OH)D2 SERPL-MCNC: 19.4 NG/ML (ref 30–100)
ALBUMIN SERPL-MCNC: 4.6 G/DL (ref 3.8–4.8)
ALBUMIN/GLOB SERPL: 1.6 {RATIO} (ref 1.2–2.2)
ALP SERPL-CCNC: 93 IU/L (ref 39–117)
ALT SERPL-CCNC: 64 IU/L (ref 0–32)
AST SERPL-CCNC: 30 IU/L (ref 0–40)
BASOPHILS # BLD AUTO: 0 X10E3/UL (ref 0–0.2)
BASOPHILS NFR BLD AUTO: 1 %
BILIRUB SERPL-MCNC: 0.2 MG/DL (ref 0–1.2)
BUN SERPL-MCNC: 7 MG/DL (ref 6–20)
BUN/CREAT SERPL: 11 (ref 9–23)
CALCIUM SERPL-MCNC: 9.6 MG/DL (ref 8.7–10.2)
CHLORIDE SERPL-SCNC: 99 MMOL/L (ref 96–106)
CHOLEST SERPL-MCNC: 235 MG/DL (ref 100–199)
CO2 SERPL-SCNC: 24 MMOL/L (ref 20–29)
CREAT SERPL-MCNC: 0.63 MG/DL (ref 0.57–1)
EOSINOPHIL # BLD AUTO: 0.1 X10E3/UL (ref 0–0.4)
EOSINOPHIL NFR BLD AUTO: 2 %
ERYTHROCYTE [DISTWIDTH] IN BLOOD BY AUTOMATED COUNT: 13.3 % (ref 11.7–15.4)
EST. AVERAGE GLUCOSE BLD GHB EST-MCNC: 111 MG/DL
GLOBULIN SER CALC-MCNC: 2.9 G/DL (ref 1.5–4.5)
GLUCOSE SERPL-MCNC: 88 MG/DL (ref 65–99)
HBA1C MFR BLD: 5.5 % (ref 4.8–5.6)
HCT VFR BLD AUTO: 40.4 % (ref 34–46.6)
HDLC SERPL-MCNC: 45 MG/DL
HGB BLD-MCNC: 13.8 G/DL (ref 11.1–15.9)
IMM GRANULOCYTES # BLD AUTO: 0 X10E3/UL (ref 0–0.1)
IMM GRANULOCYTES NFR BLD AUTO: 0 %
INTERPRETATION, 910389: NORMAL
LDLC SERPL CALC-MCNC: 121 MG/DL (ref 0–99)
LYMPHOCYTES # BLD AUTO: 2.1 X10E3/UL (ref 0.7–3.1)
LYMPHOCYTES NFR BLD AUTO: 28 %
MCH RBC QN AUTO: 30.4 PG (ref 26.6–33)
MCHC RBC AUTO-ENTMCNC: 34.2 G/DL (ref 31.5–35.7)
MCV RBC AUTO: 89 FL (ref 79–97)
MONOCYTES # BLD AUTO: 0.4 X10E3/UL (ref 0.1–0.9)
MONOCYTES NFR BLD AUTO: 6 %
NEUTROPHILS # BLD AUTO: 4.8 X10E3/UL (ref 1.4–7)
NEUTROPHILS NFR BLD AUTO: 63 %
PLATELET # BLD AUTO: 313 X10E3/UL (ref 150–450)
POTASSIUM SERPL-SCNC: 4.3 MMOL/L (ref 3.5–5.2)
PROT SERPL-MCNC: 7.5 G/DL (ref 6–8.5)
RBC # BLD AUTO: 4.54 X10E6/UL (ref 3.77–5.28)
SODIUM SERPL-SCNC: 137 MMOL/L (ref 134–144)
TRIGL SERPL-MCNC: 344 MG/DL (ref 0–149)
TSH SERPL DL<=0.005 MIU/L-ACNC: 1.93 UIU/ML (ref 0.45–4.5)
VLDLC SERPL CALC-MCNC: 69 MG/DL (ref 5–40)
WBC # BLD AUTO: 7.5 X10E3/UL (ref 3.4–10.8)

## 2020-01-22 RX ORDER — ERGOCALCIFEROL 1.25 MG/1
50000 CAPSULE ORAL
Qty: 12 CAP | Refills: 0 | Status: SHIPPED | OUTPATIENT
Start: 2020-01-22 | End: 2021-05-24

## 2020-01-22 NOTE — PROGRESS NOTES
Vit d is low, start vit D Rx, one capsule weekly, repeat the level in 3 months, Rx sent to [facility]    Cholesterol and TG are elevated, need low cholesterol/low fat diet, and repeat labs in 3 months  Labs ordered, please repeat in 3 mos  Glucose and thyroid are normal

## 2020-01-23 ENCOUNTER — TELEPHONE (OUTPATIENT)
Dept: FAMILY MEDICINE CLINIC | Age: 40
End: 2020-01-23

## 2020-01-23 ENCOUNTER — DOCUMENTATION ONLY (OUTPATIENT)
Dept: FAMILY MEDICINE CLINIC | Age: 40
End: 2020-01-23

## 2020-01-23 NOTE — PROGRESS NOTES
Pt returned call for lab results, nursing unavailable     Lab not from Dr. Anup Neal read to pt. Vit d is low, start vit D Rx, one capsule weekly, repeat the level in 3 months, Rx sent to [facility]     Cholesterol and TG are elevated, need low cholesterol/low fat diet, and repeat labs in 3 months  Labs ordered, please repeat in 3 mos  Glucose and thyroid are normal      Electronically signed by Horacio Brothers MD at 01/22/20 6522      No follow up questions.

## 2020-01-23 NOTE — TELEPHONE ENCOUNTER
Pt called back in regards to labs she would like additional in formation in regards to her liver enzymes. Please advise.

## 2020-01-24 ENCOUNTER — TELEPHONE (OUTPATIENT)
Dept: FAMILY MEDICINE CLINIC | Age: 40
End: 2020-01-24

## 2020-01-24 NOTE — TELEPHONE ENCOUNTER
Spoke with patient and advised her to get US scheduled that Dr Svetlana Quezada had ordered. She is concerned with her liver enzymes and would like a hepatic panel ordered, but patient agreed to wait until US is resulted.

## 2020-01-24 NOTE — TELEPHONE ENCOUNTER
Patient returned call she missed from [facility], patient states she can best be reached at [phone]

## 2020-02-10 ENCOUNTER — TELEPHONE (OUTPATIENT)
Dept: FAMILY MEDICINE CLINIC | Age: 40
End: 2020-02-10

## 2020-02-10 DIAGNOSIS — R74.8 ELEVATED LIVER ENZYMES: ICD-10-CM

## 2020-02-10 DIAGNOSIS — Z90.49 HISTORY OF CHOLECYSTECTOMY: ICD-10-CM

## 2020-02-10 DIAGNOSIS — R10.11 RUQ PAIN: Primary | ICD-10-CM
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
## 2020-02-20 ENCOUNTER — OFFICE VISIT (OUTPATIENT)
Dept: FAMILY MEDICINE CLINIC | Age: 40
End: 2020-02-20

## 2020-02-20 VITALS
TEMPERATURE: 97.9 F | HEART RATE: 90 BPM | SYSTOLIC BLOOD PRESSURE: 106 MMHG | OXYGEN SATURATION: 98 % | BODY MASS INDEX: 27.28 KG/M2 | WEIGHT: 159.8 LBS | RESPIRATION RATE: 16 BRPM | HEIGHT: 64 IN | DIASTOLIC BLOOD PRESSURE: 72 MMHG

## 2020-02-20 DIAGNOSIS — M25.50 POLYARTHRALGIA: ICD-10-CM

## 2020-02-20 DIAGNOSIS — G89.29 CHRONIC BILATERAL LOW BACK PAIN WITH BILATERAL SCIATICA: Primary | ICD-10-CM

## 2020-02-20 DIAGNOSIS — M54.41 CHRONIC BILATERAL LOW BACK PAIN WITH BILATERAL SCIATICA: Primary | ICD-10-CM

## 2020-02-20 DIAGNOSIS — G25.81 RESTLESS LEG: ICD-10-CM

## 2020-02-20 DIAGNOSIS — M54.42 CHRONIC BILATERAL LOW BACK PAIN WITH BILATERAL SCIATICA: Primary | ICD-10-CM

## 2020-02-20 DIAGNOSIS — R76.8 DS DNA ANTIBODY POSITIVE: ICD-10-CM

## 2020-02-20 DIAGNOSIS — M54.2 CHRONIC NECK PAIN: ICD-10-CM

## 2020-02-20 DIAGNOSIS — R70.0 ESR RAISED: ICD-10-CM

## 2020-02-20 DIAGNOSIS — G89.29 CHRONIC NECK PAIN: ICD-10-CM

## 2020-02-20 NOTE — PROGRESS NOTES
Raphael Feliz is a 36 y.o. female (: 1980) presenting to address:    Chief Complaint   Patient presents with    Back Pain     x 15 years       Vitals:    20 1323   BP: 106/72   Pulse: 90   Resp: 16   Temp: 97.9 °F (36.6 °C)   TempSrc: Oral   SpO2: 98%   Weight: 159 lb 12.8 oz (72.5 kg)   Height: 5' 4\" (1.626 m)   PainSc:   3   PainLoc: Back   LMP: 2020       Hearing/Vision:   No exam data present    Learning Assessment:     Learning Assessment 2019   PRIMARY LEARNER Patient   HIGHEST LEVEL OF EDUCATION - PRIMARY LEARNER  2 YEARS OF COLLEGE   BARRIERS PRIMARY LEARNER NONE   PRIMARY LANGUAGE ENGLISH    NEED No   LEARNER PREFERENCE PRIMARY DEMONSTRATION   ANSWERED BY patient   RELATIONSHIP SELF     Depression Screening:     3 most recent PHQ Screens 2020   PHQ Not Done Active Diagnosis of Depression or Bipolar Disorder   Little interest or pleasure in doing things Not at all   Feeling down, depressed, irritable, or hopeless Not at all   Total Score PHQ 2 0     Fall Risk Assessment:   No flowsheet data found. Abuse Screening:     Abuse Screening Questionnaire 2019   Do you ever feel afraid of your partner? N   Are you in a relationship with someone who physically or mentally threatens you? N   Is it safe for you to go home? Y     Coordination of Care Questionaire:   1. Have you been to the ER, urgent care clinic since your last visit? Hospitalized since your last visit? NO    2. Have you seen or consulted any other health care providers outside of the [address] [place] [address] since your last visit? Include any pap smears or colon screening. NO    Advanced Directive:   1. Do you have an Advanced Directive? NO    2. Would you like information on Advanced Directives?  NO

## 2020-02-20 NOTE — PATIENT INSTRUCTIONS
To Do: - sign up for [employer]  - send me a [employer] message with the name(s) and address(es) of the places in which you had surgeries, MRIs  - okay to use mobic intermittently if you like  - bloodwork at your convenience    After you are done with your high dose vitamin D, you should probably take at least 4000 units of VitD3 on a daily basis for the rest of your life    Likely I will be ordering an MRI after I get your lumbar x-rays back. Will let you know via [employer]. Neck -> XR, review MRI  Lumbar -> XR -> MRI  Hips -> eventually XR  Polyarthralgia -> bloodwork        TESTING RESULTS   Results will be released to [employer]. Normal results will be sent via mail. If you have questions about your results, please schedule a follow up appointment to discuss with your PCP. MEDICATION REFILLS    Please allow at least 2 business days for refill requests to be addressed. Medication refills may be requested through your pharmacy. Refills will not be provided by the after hours/on call provider.

## 2020-02-24 DIAGNOSIS — R74.8 ELEVATED LIVER ENZYMES: ICD-10-CM

## 2020-03-06 NOTE — TELEPHONE ENCOUNTER
Requested Prescriptions     Pending Prescriptions Disp Refills    cyclobenzaprine (FLEXERIL) 10 mg tablet 30 Tab 2     Sig: Take 1 Tab by mouth three (3) times daily as needed for Muscle Spasm(s).

## 2020-03-09 RX ORDER — CYCLOBENZAPRINE HCL 10 MG
10 TABLET ORAL
Qty: 30 TAB | Refills: 2 | Status: SHIPPED | OUTPATIENT
Start: 2020-03-09 | End: 2020-05-12 | Stop reason: SDUPTHER

## 2020-03-09 NOTE — TELEPHONE ENCOUNTER
Please call Ms. Kristy Ruiz     \"Dr. Claudia Garrett needs the contact information for the medical facilities at which you have had surgeries and or MRIs in the past.  She is waiting on that information from those places in order to review your previous neck MRI and order your low back MRI\"    See info in Patient Instructions for details. Thanks. Orders Placed This Encounter    cyclobenzaprine (FLEXERIL) 10 mg tablet     Sig: Take 1 Tab by mouth three (3) times daily as needed for Muscle Spasm(s).      Dispense:  30 Tab     Refill:  2

## 2020-03-20 NOTE — TELEPHONE ENCOUNTER
Patient called requesting a refill on his medication. Requested Prescriptions     Pending Prescriptions Disp Refills    doxepin (SINEquan) 25 mg capsule 60 Cap 2     Sig: Take 2 Caps by mouth nightly. No future appointments.

## 2020-03-22 RX ORDER — DOXEPIN HYDROCHLORIDE 25 MG/1
50 CAPSULE ORAL
Qty: 60 CAP | Refills: 2 | Status: SHIPPED | OUTPATIENT
Start: 2020-03-22 | End: 2020-06-12 | Stop reason: SDUPTHER

## 2020-05-12 ENCOUNTER — TELEPHONE (OUTPATIENT)
Dept: FAMILY MEDICINE CLINIC | Age: 40
End: 2020-05-12

## 2020-05-12 NOTE — TELEPHONE ENCOUNTER
Pt had an antibody test done at [facility] last week Thursday and she wanted to see if they had faxed us those results here because she has tried to get in contact with them and she has not been able to get a hold of anyone there.  Please advise

## 2020-05-13 NOTE — TELEPHONE ENCOUNTER
Spoke with Dr. Janell Flood he has not seen any results for this patient . She need to contact the clinic where is was done to get results.

## 2020-06-12 NOTE — TELEPHONE ENCOUNTER
Patient called requesting refills be sent, please advise    Requested Prescriptions     Pending Prescriptions Disp Refills    doxepin (SINEquan) 25 mg capsule 60 Cap 2     Sig: Take 2 Caps by mouth nightly as needed (for sleep).

## 2020-06-13 RX ORDER — DOXEPIN HYDROCHLORIDE 25 MG/1
50 CAPSULE ORAL
Qty: 60 CAP | Refills: 2 | Status: SHIPPED | OUTPATIENT
Start: 2020-06-13 | End: 2020-09-04 | Stop reason: SDUPTHER

## 2020-07-08 RX ORDER — OMEPRAZOLE 40 MG/1
40 CAPSULE, DELAYED RELEASE ORAL DAILY
Qty: 90 CAP | Refills: 0 | Status: SHIPPED | OUTPATIENT
Start: 2020-07-08 | End: 2021-01-26 | Stop reason: SDUPTHER

## 2020-07-08 NOTE — TELEPHONE ENCOUNTER
Patient called requesting a refill on her medication. Requested Prescriptions     Pending Prescriptions Disp Refills    omeprazole (PRILOSEC) 40 mg capsule       Sig: Take 1 Cap by mouth daily as needed. No future appointments.

## 2020-09-04 NOTE — TELEPHONE ENCOUNTER
Requested Prescriptions     Pending Prescriptions Disp Refills    cyclobenzaprine (FLEXERIL) 10 mg tablet 90 Tab 1     Sig: Take 1 Tab by mouth three (3) times daily as needed for Muscle Spasm(s).  doxepin (SINEquan) 25 mg capsule 60 Cap 2     Sig: Take 2 Caps by mouth nightly as needed (for sleep).

## 2020-09-09 RX ORDER — CYCLOBENZAPRINE HCL 10 MG
10 TABLET ORAL
Qty: 90 TAB | Refills: 1 | OUTPATIENT
Start: 2020-09-09

## 2020-09-09 RX ORDER — CYCLOBENZAPRINE HCL 10 MG
10 TABLET ORAL
Qty: 90 TAB | Refills: 2 | Status: SHIPPED | OUTPATIENT
Start: 2020-09-09 | End: 2020-11-19 | Stop reason: SDUPTHER

## 2020-09-09 RX ORDER — DOXEPIN HYDROCHLORIDE 25 MG/1
50 CAPSULE ORAL
Qty: 60 CAP | Refills: 0 | Status: SHIPPED | OUTPATIENT
Start: 2020-09-09 | End: 2020-10-21 | Stop reason: SDUPTHER

## 2020-09-10 NOTE — TELEPHONE ENCOUNTER
Orders Placed This Encounter    cyclobenzaprine (FLEXERIL) 10 mg tablet     Sig: Take 1 Tab by mouth three (3) times daily as needed for Muscle Spasm(s).      Dispense:  90 Tab     Refill:  2

## 2020-10-21 RX ORDER — DOXEPIN HYDROCHLORIDE 25 MG/1
50 CAPSULE ORAL
Qty: 60 CAP | Refills: 0 | Status: SHIPPED | OUTPATIENT
Start: 2020-10-21 | End: 2020-11-19 | Stop reason: SDUPTHER

## 2020-10-21 NOTE — TELEPHONE ENCOUNTER
Patient called requesting a refill on her medication. Requested Prescriptions     Pending Prescriptions Disp Refills    doxepin (SINEquan) 25 mg capsule 60 Cap 0     Sig: Take 2 Caps by mouth nightly as needed (for sleep). No future appointments.

## 2020-11-19 NOTE — TELEPHONE ENCOUNTER
Requested Prescriptions     Pending Prescriptions Disp Refills    doxepin (SINEquan) 25 mg capsule 60 Cap 0     Sig: Take 2 Caps by mouth nightly as needed (for sleep).  cyclobenzaprine (FLEXERIL) 10 mg tablet 90 Tab 2     Sig: Take 1 Tab by mouth three (3) times daily as needed for Muscle Spasm(s).

## 2020-11-20 RX ORDER — DOXEPIN HYDROCHLORIDE 25 MG/1
50 CAPSULE ORAL
Qty: 60 CAP | Refills: 0 | Status: SHIPPED | OUTPATIENT
Start: 2020-11-20 | End: 2020-12-17 | Stop reason: SDUPTHER

## 2020-11-20 RX ORDER — CYCLOBENZAPRINE HCL 10 MG
10 TABLET ORAL
Qty: 90 TAB | Refills: 2 | Status: SHIPPED | OUTPATIENT
Start: 2020-11-20 | End: 2021-05-05 | Stop reason: SDUPTHER

## 2020-12-17 NOTE — TELEPHONE ENCOUNTER
Requested Prescriptions     Pending Prescriptions Disp Refills    doxepin (SINEquan) 25 mg capsule 60 Cap 0     Sig: Take 2 Caps by mouth nightly as needed (for sleep).

## 2020-12-19 RX ORDER — DOXEPIN HYDROCHLORIDE 25 MG/1
50 CAPSULE ORAL
Qty: 60 CAP | Refills: 0 | Status: SHIPPED | OUTPATIENT
Start: 2020-12-19 | End: 2021-01-26 | Stop reason: SDUPTHER

## 2021-01-26 ENCOUNTER — OFFICE VISIT (OUTPATIENT)
Dept: FAMILY MEDICINE CLINIC | Age: 41
End: 2021-01-26
Payer: OTHER GOVERNMENT

## 2021-01-26 ENCOUNTER — APPOINTMENT (OUTPATIENT)
Dept: FAMILY MEDICINE CLINIC | Age: 41
End: 2021-01-26

## 2021-01-26 VITALS
HEART RATE: 97 BPM | BODY MASS INDEX: 26.87 KG/M2 | SYSTOLIC BLOOD PRESSURE: 105 MMHG | WEIGHT: 157.4 LBS | TEMPERATURE: 98.1 F | RESPIRATION RATE: 20 BRPM | OXYGEN SATURATION: 97 % | DIASTOLIC BLOOD PRESSURE: 75 MMHG | HEIGHT: 64 IN

## 2021-01-26 DIAGNOSIS — E55.9 VITAMIN D DEFICIENCY: ICD-10-CM

## 2021-01-26 DIAGNOSIS — E78.2 MIXED HYPERLIPIDEMIA: ICD-10-CM

## 2021-01-26 DIAGNOSIS — Z12.31 BREAST CANCER SCREENING BY MAMMOGRAM: ICD-10-CM

## 2021-01-26 DIAGNOSIS — K21.9 GASTROESOPHAGEAL REFLUX DISEASE, UNSPECIFIED WHETHER ESOPHAGITIS PRESENT: ICD-10-CM

## 2021-01-26 DIAGNOSIS — N93.9 VAGINAL BLEEDING: ICD-10-CM

## 2021-01-26 DIAGNOSIS — F51.01 PRIMARY INSOMNIA: ICD-10-CM

## 2021-01-26 DIAGNOSIS — Z00.00 ROUTINE GENERAL MEDICAL EXAMINATION AT A HEALTH CARE FACILITY: Primary | ICD-10-CM

## 2021-01-26 PROBLEM — G89.29 CHRONIC NECK PAIN: Status: ACTIVE | Noted: 2021-01-26

## 2021-01-26 PROBLEM — M54.2 CHRONIC NECK PAIN: Status: ACTIVE | Noted: 2021-01-26

## 2021-01-26 PROCEDURE — 99396 PREV VISIT EST AGE 40-64: CPT | Performed by: INTERNAL MEDICINE

## 2021-01-26 RX ORDER — MELOXICAM 15 MG/1
15 TABLET ORAL
COMMUNITY
End: 2021-03-05 | Stop reason: SDUPTHER

## 2021-01-26 RX ORDER — ACETAMINOPHEN 500 MG
2000 TABLET ORAL DAILY
COMMUNITY

## 2021-01-26 RX ORDER — DOXEPIN HYDROCHLORIDE 50 MG/1
50 CAPSULE ORAL
Qty: 90 CAP | Refills: 1 | Status: SHIPPED | OUTPATIENT
Start: 2021-01-26 | End: 2021-08-20 | Stop reason: SDUPTHER

## 2021-01-26 RX ORDER — OMEPRAZOLE 40 MG/1
40 CAPSULE, DELAYED RELEASE ORAL DAILY
Qty: 90 CAP | Refills: 3 | Status: SHIPPED | OUTPATIENT
Start: 2021-01-26 | End: 2021-11-29 | Stop reason: SDUPTHER

## 2021-01-26 RX ORDER — BISMUTH SUBSALICYLATE 262 MG
1 TABLET,CHEWABLE ORAL DAILY
COMMUNITY

## 2021-01-26 NOTE — PROGRESS NOTES
HISTORY OF PRESENT ILLNESS  Sandra Cabral is a 36 y.o. female. HPI  In for CPE  Vit D def, ? Control, she is on otc vit D, last level was 19, she took Rx vit D for 3 months  GERD, stable on prilosec daily  Insomnia, stable on Doxepin as needed  Followed by Dr Tisha Barker for her back and neck pain, will renew her referral, advised to schedule follow up with her soon  C/o vaginal bleeding after intercourse, need referral to her Gynecologist  DEEPAK, last LDL was 121, will repeat labs  She declined flu and Tdap vaccines today  Review of Systems   Constitutional: Negative for chills and fever. Respiratory: Negative for shortness of breath. Cardiovascular: Negative for chest pain and palpitations. Gastrointestinal: Negative for nausea. Genitourinary: Negative for dysuria. Musculoskeletal: Negative for falls. Neurological: Negative for headaches. Psychiatric/Behavioral: Negative for depression. Physical Exam  Vitals signs reviewed. Constitutional:       Appearance: Normal appearance. HENT:      Right Ear: Tympanic membrane and ear canal normal.      Left Ear: Tympanic membrane and ear canal normal.   Eyes:      Extraocular Movements: Extraocular movements intact. Conjunctiva/sclera: Conjunctivae normal.      Pupils: Pupils are equal, round, and reactive to light. Neck:      Musculoskeletal: Neck supple. Cardiovascular:      Rate and Rhythm: Normal rate and regular rhythm. Heart sounds: Normal heart sounds. Pulmonary:      Effort: Pulmonary effort is normal.      Breath sounds: Normal breath sounds. No rales. Abdominal:      Palpations: Abdomen is soft. There is no hepatomegaly or splenomegaly. Tenderness: There is no abdominal tenderness. Musculoskeletal:      Right lower leg: No edema. Left lower leg: No edema. Neurological:      Mental Status: She is alert and oriented to person, place, and time.          ASSESSMENT and PLAN  Diagnoses and all orders for this visit: 1. Routine general medical examination at a health care facility  -     LIPID PANEL; Future  -     CBC WITH AUTOMATED DIFF; Future  -     METABOLIC PANEL, BASIC; Future    2. Vitamin D deficiency  -     VITAMIN D, 25 HYDROXY; Future    3. Gastroesophageal reflux disease, unspecified whether esophagitis present  -     omeprazole (PRILOSEC) 40 mg capsule; Take 1 Cap by mouth daily. 4. Primary insomnia  -     doxepin (SINEquan) 50 mg capsule; Take 1 Cap by mouth nightly as needed (for sleep). 5. Breast cancer screening by mammogram  -     [facility] MAMMO BI SCREENING INCL CAD; Future    6. Mixed hyperlipidemia  -     LIPID PANEL; Future  -     CBC WITH AUTOMATED DIFF; Future    7. Vaginal bleeding  -     REFERRAL TO GYNECOLOGY    Pt moved recently to [place], she will establish care with our [address] [place] [address] office in the next 2-3 months since it is much closer to home, information given to her today  Follow-up and Dispositions    · Return in about 3 months (around 4/26/2021).        Lab Results   Component Value Date/Time    Cholesterol, total 235 (H) 01/21/2020 02:05 AM    HDL Cholesterol 45 01/21/2020 02:05 AM    LDL, calculated 121 (H) 01/21/2020 02:05 AM    VLDL, calculated 69 (H) 01/21/2020 02:05 AM    Triglyceride 344 (H) 01/21/2020 02:05 AM     Lab Results   Component Value Date/Time    VITAMIN D, 25-HYDROXY 19.4 (L) 01/21/2020 02:05 AM

## 2021-01-28 LAB
25(OH)D3+25(OH)D2 SERPL-MCNC: 44.5 NG/ML (ref 30–100)
ANA SER QL: POSITIVE
BASOPHILS # BLD AUTO: 0 X10E3/UL (ref 0–0.2)
BASOPHILS NFR BLD AUTO: 1 %
BUN SERPL-MCNC: 8 MG/DL (ref 6–24)
BUN/CREAT SERPL: 13 (ref 9–23)
CALCIUM SERPL-MCNC: 9.8 MG/DL (ref 8.7–10.2)
CCP IGA+IGG SERPL IA-ACNC: 8 UNITS (ref 0–19)
CENTROMERE B AB SER-ACNC: <0.2 AI (ref 0–0.9)
CHLORIDE SERPL-SCNC: 100 MMOL/L (ref 96–106)
CHOLEST SERPL-MCNC: 242 MG/DL (ref 100–199)
CHROMATIN AB SERPL-ACNC: <0.2 AI (ref 0–0.9)
CO2 SERPL-SCNC: 21 MMOL/L (ref 20–29)
CREAT SERPL-MCNC: 0.64 MG/DL (ref 0.57–1)
CRP SERPL-MCNC: 4 MG/L (ref 0–10)
DSDNA AB SER-ACNC: 10 IU/ML (ref 0–9)
ENA JO1 AB SER-ACNC: <0.2 AI (ref 0–0.9)
ENA RNP AB SER-ACNC: 0.9 AI (ref 0–0.9)
ENA SCL70 AB SER-ACNC: <0.2 AI (ref 0–0.9)
ENA SM AB SER-ACNC: <0.2 AI (ref 0–0.9)
ENA SS-A AB SER-ACNC: <0.2 AI (ref 0–0.9)
ENA SS-B AB SER-ACNC: <0.2 AI (ref 0–0.9)
EOSINOPHIL # BLD AUTO: 0.1 X10E3/UL (ref 0–0.4)
EOSINOPHIL NFR BLD AUTO: 2 %
ERYTHROCYTE [DISTWIDTH] IN BLOOD BY AUTOMATED COUNT: 12.7 % (ref 11.7–15.4)
ERYTHROCYTE [SEDIMENTATION RATE] IN BLOOD BY WESTERGREN METHOD: 49 MM/HR (ref 0–32)
FERRITIN SERPL-MCNC: 57 NG/ML (ref 15–150)
FOLATE SERPL-MCNC: >20 NG/ML
GLUCOSE SERPL-MCNC: 92 MG/DL (ref 65–99)
HCT VFR BLD AUTO: 39.2 % (ref 34–46.6)
HDLC SERPL-MCNC: 41 MG/DL
HGB BLD-MCNC: 13.9 G/DL (ref 11.1–15.9)
IMM GRANULOCYTES # BLD AUTO: 0 X10E3/UL (ref 0–0.1)
IMM GRANULOCYTES NFR BLD AUTO: 0 %
INTERPRETATION, 910389: NORMAL
IRON SATN MFR SERPL: 39 % (ref 15–55)
IRON SERPL-MCNC: 156 UG/DL (ref 27–159)
LDLC SERPL CALC-MCNC: 131 MG/DL (ref 0–99)
LYMPHOCYTES # BLD AUTO: 2.2 X10E3/UL (ref 0.7–3.1)
LYMPHOCYTES NFR BLD AUTO: 29 %
MCH RBC QN AUTO: 32 PG (ref 26.6–33)
MCHC RBC AUTO-ENTMCNC: 35.5 G/DL (ref 31.5–35.7)
MCV RBC AUTO: 90 FL (ref 79–97)
MONOCYTES # BLD AUTO: 0.4 X10E3/UL (ref 0.1–0.9)
MONOCYTES NFR BLD AUTO: 5 %
NEUTROPHILS # BLD AUTO: 5 X10E3/UL (ref 1.4–7)
NEUTROPHILS NFR BLD AUTO: 63 %
PLATELET # BLD AUTO: 297 X10E3/UL (ref 150–450)
POTASSIUM SERPL-SCNC: 4.4 MMOL/L (ref 3.5–5.2)
RBC # BLD AUTO: 4.35 X10E6/UL (ref 3.77–5.28)
SEE BELOW, 164869: ABNORMAL
SODIUM SERPL-SCNC: 140 MMOL/L (ref 134–144)
TIBC SERPL-MCNC: 397 UG/DL (ref 250–450)
TRIGL SERPL-MCNC: 389 MG/DL (ref 0–149)
UIBC SERPL-MCNC: 241 UG/DL (ref 131–425)
URATE SERPL-MCNC: 5.6 MG/DL (ref 2.6–6.2)
VIT B12 SERPL-MCNC: 739 PG/ML (ref 232–1245)
VLDLC SERPL CALC-MCNC: 70 MG/DL (ref 5–40)
WBC # BLD AUTO: 7.9 X10E3/UL (ref 3.4–10.8)

## 2021-01-29 ENCOUNTER — TELEPHONE (OUTPATIENT)
Dept: FAMILY MEDICINE CLINIC | Age: 41
End: 2021-01-29

## 2021-01-29 NOTE — TELEPHONE ENCOUNTER
Pt had called about LFTs. There are open orders,[facility] is able to add these. They will send us orders to cosign. Pt is aware.

## 2021-01-29 NOTE — PROGRESS NOTES
Vit D is normal, continue vit D daily  Cholesterol and TG are still elevated, need to comply with low cholesterol. low carb diet, repeat in 6 months  Glucose/kidneys tests are normal

## 2021-02-01 LAB
CCP IGA+IGG SERPL IA-ACNC: 6 UNITS (ref 0–19)
CRP SERPL-MCNC: 4 MG/L (ref 0–10)
ERYTHROCYTE [SEDIMENTATION RATE] IN BLOOD BY WESTERGREN METHOD: 49 MM/HR (ref 0–32)
RHEUMATOID FACT SERPL-ACNC: <10 IU/ML (ref 0–13.9)
SPECIMEN STATUS REPORT, ROLRST: NORMAL

## 2021-02-01 NOTE — PROGRESS NOTES
Called ptcarole to call back. She will need to be redrawn due to [facility] error of not adding the additional tests requesting on 1/29/21. Pt to call back to set appt here or let us know if she is going to do these at [facility] or should we fax to [address] [place] [address] near her?

## 2021-02-01 NOTE — PROGRESS NOTES
That is what [facility] told me on 1/29/21. I called Hunter Lovelace back today and that rep didn't process the order. They can process the hep C but now the hepatic panel is too old and pt will have to come back for redraw.

## 2021-02-01 NOTE — TELEPHONE ENCOUNTER
Contacted patient to inform her of status for GYNECOLOGY referral and patient mentioned that she would like to re-do the labs needed at [facility] outpatient lab. I did not see any new orders in the system. Please re-order needed labs and fax to [facility] - once labs are faxed, patient is requesting a call to know that has been completed so she can go get her labs re-drawn.

## 2021-02-02 DIAGNOSIS — R74.8 ELEVATED LIVER ENZYMES: Primary | ICD-10-CM

## 2021-02-02 LAB
HCV AB S/CO SERPL IA: <0.1 S/CO RATIO (ref 0–0.9)
SPECIMEN STATUS REPORT, ROLRST: NORMAL

## 2021-02-03 NOTE — TELEPHONE ENCOUNTER
I left pt a voicemail message stating they were ordered and she can have them done at [facility].

## 2021-02-11 NOTE — PROGRESS NOTES
Paola, I placed the order for LFT's but they are not done yet, please advise pt that she can get this at [facility] lab.

## 2021-02-25 ENCOUNTER — TELEPHONE (OUTPATIENT)
Dept: FAMILY MEDICINE CLINIC | Age: 41
End: 2021-02-25

## 2021-02-25 DIAGNOSIS — M54.41 CHRONIC BILATERAL LOW BACK PAIN WITH BILATERAL SCIATICA: ICD-10-CM

## 2021-02-25 DIAGNOSIS — G89.29 CHRONIC NECK PAIN: Primary | ICD-10-CM

## 2021-02-25 DIAGNOSIS — M54.2 CHRONIC NECK PAIN: Primary | ICD-10-CM

## 2021-02-25 DIAGNOSIS — G89.29 CHRONIC BILATERAL LOW BACK PAIN WITH BILATERAL SCIATICA: ICD-10-CM

## 2021-02-25 DIAGNOSIS — M54.42 CHRONIC BILATERAL LOW BACK PAIN WITH BILATERAL SCIATICA: ICD-10-CM

## 2021-02-25 NOTE — TELEPHONE ENCOUNTER
Pt is requesting the referral to Dr. Rabia Bradford. Pt needs a new referral generated to Dr. Rabia Bradford for back pain.

## 2021-03-02 NOTE — PROGRESS NOTES
Spoke with pt and reminded her of labs. She said she was out of town for a week and thinks she will ask to have it done when she sees Dr Marley Pineda soon. I gave her the information that she can have this done at [facility] [address], [place] [address] or [facility] sooner if she likes.

## 2021-03-05 ENCOUNTER — OFFICE VISIT (OUTPATIENT)
Dept: FAMILY MEDICINE CLINIC | Age: 41
End: 2021-03-05
Payer: OTHER GOVERNMENT

## 2021-03-05 VITALS
TEMPERATURE: 97.9 F | SYSTOLIC BLOOD PRESSURE: 124 MMHG | OXYGEN SATURATION: 99 % | RESPIRATION RATE: 16 BRPM | HEART RATE: 79 BPM | BODY MASS INDEX: 27.18 KG/M2 | HEIGHT: 64 IN | WEIGHT: 159.2 LBS | DIASTOLIC BLOOD PRESSURE: 81 MMHG

## 2021-03-05 DIAGNOSIS — R76.8 DS DNA ANTIBODY POSITIVE: ICD-10-CM

## 2021-03-05 DIAGNOSIS — M54.41 CHRONIC BILATERAL LOW BACK PAIN WITH BILATERAL SCIATICA: ICD-10-CM

## 2021-03-05 DIAGNOSIS — M25.562 CHRONIC PAIN OF BOTH KNEES: ICD-10-CM

## 2021-03-05 DIAGNOSIS — M25.561 CHRONIC PAIN OF BOTH KNEES: ICD-10-CM

## 2021-03-05 DIAGNOSIS — G89.29 CHRONIC NECK PAIN: ICD-10-CM

## 2021-03-05 DIAGNOSIS — G25.81 RESTLESS LEG: ICD-10-CM

## 2021-03-05 DIAGNOSIS — G89.29 CHRONIC LEFT HIP PAIN: Primary | ICD-10-CM

## 2021-03-05 DIAGNOSIS — M25.50 POLYARTHRALGIA: ICD-10-CM

## 2021-03-05 DIAGNOSIS — M79.641 PAIN IN BOTH HANDS: ICD-10-CM

## 2021-03-05 DIAGNOSIS — R70.0 ESR RAISED: ICD-10-CM

## 2021-03-05 DIAGNOSIS — G89.29 CHRONIC PAIN OF BOTH KNEES: ICD-10-CM

## 2021-03-05 DIAGNOSIS — G89.29 CHRONIC BILATERAL LOW BACK PAIN WITH BILATERAL SCIATICA: ICD-10-CM

## 2021-03-05 DIAGNOSIS — M54.42 CHRONIC BILATERAL LOW BACK PAIN WITH BILATERAL SCIATICA: ICD-10-CM

## 2021-03-05 DIAGNOSIS — M54.2 CHRONIC NECK PAIN: ICD-10-CM

## 2021-03-05 DIAGNOSIS — M25.552 CHRONIC LEFT HIP PAIN: Primary | ICD-10-CM

## 2021-03-05 DIAGNOSIS — M79.642 PAIN IN BOTH HANDS: ICD-10-CM

## 2021-03-05 PROCEDURE — 99215 OFFICE O/P EST HI 40 MIN: CPT | Performed by: FAMILY MEDICINE

## 2021-03-05 RX ORDER — PREDNISONE 20 MG/1
20 TABLET ORAL
Qty: 10 TAB | Refills: 0 | Status: SHIPPED | OUTPATIENT
Start: 2021-03-05 | End: 2021-03-15

## 2021-03-05 RX ORDER — MELOXICAM 15 MG/1
15 TABLET ORAL
Qty: 90 TAB | Refills: 1 | Status: SHIPPED | OUTPATIENT
Start: 2021-03-05 | End: 2021-08-17 | Stop reason: SDUPTHER

## 2021-03-05 NOTE — PROGRESS NOTES
Note to patient:  The purpose of this note is to communicate optimally with the other physicians / APCs involved in your care. It is written using standard medical terminology. If you have questions regarding the details of the note, please contact my office to schedule an appointment to address your questions. [employer] [facility] Consultation Note PCP: Sumi Guzman MD 
Requesting provider: Sumi Guzman MD 
 
  
Leandro Lao is a 39 y.o. female (: 1980) presenting to obtain consultative services regarding: Chief Complaint Patient presents with  Back Pain  
  x 2 months  Neck Pain  
  x many years Assessment/Plan: ICD-10-CM ICD-9-CM 1. Chronic left hip pain  M25.552 719.45  
 G89.29 338.29  
2. Chronic bilateral low back pain with bilateral sciatica  M54.42 724.2 M54.41 724.3 G89.29 338.29  
3. Ds DNA antibody positive  R76.8 795.79  
4. ESR raised  R70.0 790.1 5. Polyarthralgia  M25.50 719.49  
6. Chronic neck pain  M54.2 723.1 G89.29 338.29  
7. Restless leg  G25.81 333.94  
8. Chronic pain of both knees  M25.561 719.46  
 M25.562 338.29 G89.29   
9. Pain in both hands  M79.641 729.5  
 M79.642 # chronic L hip pain - worsened High suspicion for ARTURO 
> MRI L hip without contrast 
 
# chronic low back pain with B sciatica - worsened Need advanced imaging to determine if therapeutic interventions are warranted 
> continue flexeril PRN qhs 
> MRI L-spine without contrast 
Offered trial of other msk relaxants for daytime use, however declined at this time # Restless Leg - working dx, with slightly low ferritin 
> recommended ailyn iron fish since intolerant of supplemental fe # polyarthralgia # ESR raised # DS DNA + Has pending rheum eval 
> trial of prednisone 20mg daily x 10d. Advised to hold mobic, and to monitor joint pains Orders Placed This Encounter  predniSONE (DELTASONE) 20 mg tablet Sig: Take 20 mg by mouth daily (with breakfast) for 10 days. Dispense:  10 Tab Refill:  0  
 meloxicam (Mobic) 15 mg tablet Sig: Take 1 Tab by mouth daily as needed for Pain. Dispense:  90 Tab Refill:  1 Overdue  items: Ms. Vandana Mitchell has a reminder for a \"due or due soon\" health maintenance. I have asked that she contact her primary care provider for follow-up on this health maintenance. Management plan & patient instructions discussed with Kyle Singleton, who voiced understanding. Routed information to requesting provider in shared medical record. Thank you for the opportunity to participate in the care of this patient. If any questions or concerns at all, please feel free to contact me. This document may have been created with the aid of dictation software. Text may contain errors, particularly phonetic errors. Lorrie Miranda MD 
Internal Medicine, Family Medicine & Sports Medicine 3/5/2021 On this date 3/5/2021, I have spent 54 minutes providing care to this patient, which included reviewing the EMR to see if there were any recent visits to the ED, specialists, prior lab or radiology results, obtaining the history from the patient, examining the patient, providing discharge education regarding the diagnosis and counseling on appropriate follow-up, as well as documenting this visit in the EMR. Subjective History:  
 
I was asked to provide consultative services by Donell Gilbert MD for advice/opinion related to evaluating Chief Complaint Patient presents with  Back Pain  
  x 2 months  Neck Pain  
  x many years LBP > left hip > neck pain > right hip L hip issue: 
Since last time, moved into new house, pulling on a rug in Nov 2020. 
2 days later, could hardly walk for 2 weeks. Heat + mobic Since then, has been hurting it a lot, more so on the left side.  \"feels like bone, and something deeper, and then into the left groin\" LBP: 
Waking up in the middle of the night with the left foot numb. Generally on her back. Got a new mattress. Most comfortable position is lying on either side Flat supine is worst 
Can tolerate hook lying Denies any urinary symptoms Menses are irregular & heavy Does not some worsening back pain with bowel movements. Feels like pulling on low back. \"And I have IBS\". Standing still for long periods of time is difficult, but being able to shift her weight is helpful Cannot sit for long periods of time because of the pain Cannot to squats or sit ups Current 4 of 10 Worst \"after the move\" 7 of 10 B hand pain: 
mobic helps significantly with hands. Middle finger on right hand is the most affected. Fingers go numb in the middle of the numb constantly. R > L knee pain: 
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 
 
+ SLR left Past Medical History:  
Diagnosis Date  Arthritis  Chronic pain  Depression  GERD (gastroesophageal reflux disease)  Hypercholesterolemia  Thromboembolus ([address]  Trauma Past Surgical History:  
Procedure Laterality Date  HX CERVICAL DISKECTOMY  2014 C5/6 in [facility], [address]  HX GYN  2011  
 cyst removal from left ovary  HX HEENT  2008  
 septo rhinoplasty  HX ORTHOPAEDIC Right 2010  
 hip for labral tear and ARTURO  
 HX ORTHOPAEDIC Right 2011  
 right hip psoas release and clean up  HX ORTHOPAEDIC Left 2011  
 shoulder ablation  HX TONSILLECTOMY  2009  
 
 reports that she quit smoking about 1 months ago. Her smoking use included cigarettes. She has never used smokeless tobacco. She reports current alcohol use of about 2.0 standard drinks of alcohol per week. She reports that she does not use drugs. Family History Problem Relation Age of Onset  COPD Mother  Cancer Mother  Elevated Lipids Father  Cancer Sister  Hypertension Maternal Grandmother  Cancer Maternal Grandfather  Heart Attack Paternal Grandmother  Diabetes Paternal Grandmother No Known Allergies Problem List:  
  
Patient Active Problem List  
 Diagnosis  Chronic neck pain  S/P diskectomy  Chronic back pain  Chronic pain of both knees  Pain in both hands  Arthralgia  Meniere's disease of both ears  Osteoarthritis of spine with radiculopathy, cervical region  H/O cervical spine surgery Medications:  
 
Current Outpatient Medications on File Prior to Visit Medication Sig Dispense Refill  multivitamin (ONE A DAY) tablet Take 1 Tab by mouth daily.  meloxicam (Mobic) 15 mg tablet Take 15 mg by mouth daily as needed for Pain.  cholecalciferol (VITAMIN D3) (2,000 UNITS /50 MCG) cap capsule Take  by mouth daily.  omeprazole (PRILOSEC) 40 mg capsule Take 1 Cap by mouth daily. (Patient taking differently: Take 40 mg by mouth daily as needed.) 90 Cap 3  
 doxepin (SINEquan) 50 mg capsule Take 1 Cap by mouth nightly as needed (for sleep). 90 Cap 1  cyclobenzaprine (FLEXERIL) 10 mg tablet Take 1 Tab by mouth three (3) times daily as needed for Muscle Spasm(s). 90 Tab 2  
 ergocalciferol (ERGOCALCIFEROL) 1,250 mcg (50,000 unit) capsule Take 1 Cap by mouth every seven (7) days. 12 Cap 0 No current facility-administered medications on file prior to visit. Review of Systems:  
 
Review of Systems Objective Physical Assessment:  
VS:   
Vitals:  
 03/05/21 1253 BP: 124/81 Pulse: 79 Resp: 16 Temp: 97.9 °F (36.6 °C) TempSrc: Temporal  
SpO2: 99% Weight: 159 lb 3.2 oz (72.2 kg) Height: 5' 3.5\" (1.613 m) PainSc:   4 PainLoc: Back LMP: 01/25/2021 Physical Exam 
Nursing note reviewed. Constitutional:   
   General: She is not in acute distress. Appearance: She is well-developed.  She is not diaphoretic. HENT:  
   Head: Normocephalic and atraumatic. Eyes:  
   Conjunctiva/sclera: Conjunctivae normal.  
Neck: Musculoskeletal: Neck supple. Musculoskeletal:  
   Right hip: She exhibits bony tenderness (great troch). Left hip: She exhibits decreased range of motion (p!! with FADIR) and bony tenderness (great troch). Cervical back: She exhibits decreased range of motion. Lumbar back: She exhibits decreased range of motion (p! with extension), tenderness (B paraspinal muscles), bony tenderness and spasm. Back: 
 
     Legs: 
 
Skin: 
   General: Skin is warm and dry. Findings: No rash. Neurological:  
   Mental Status: She is alert and oriented to person, place, and time. Sensory: No sensory deficit. Gait: Gait is intact. Gait normal.  
   Comments: Able to heel walk and toe walk without difficulty Psychiatric:     
   Behavior: Behavior normal.     
   Thought Content: Thought content normal.  
 
 
 
Recent Labs & Imaging: XR Results (maximum last 3): Results from Appointment encounter on 02/20/20 XR SPINE LUMB COMP W BEND Narrative Lumbar spine series with flexion-extension views Indication: Chronic low back pain. Findings: 
 
AP, lateral, and bilateral oblique views of the lumbar spine are submitted for 
evaluation along with flexion and extension views. There is slight left 
convexity at L4 vertebral body. There is no evidence of fracture or subluxation. No evidence for instability with flexion or extension. The disc spaces are 
normal in height. The paravertebral soft tissue structures are unremarkable. Osseous mineralization is radiographically normal. 
  
 Impression Impression: 
 
Slight left convexity. No evidence of fracture, subluxation, or instability. XR SPINE CERV W OBL/FLEX/EXT MIN 6 V COMP Narrative EXAM: CERVICAL SPINE RADIOGRAPHS CLINICAL INDICATION/HISTORY: Cervical neck pain 
-Additional: None COMPARISON: None TECHNIQUE: Open-mouth odontoid, lateral neutral, lateral flexion, lateral 
extension, and bilateral oblique views of the cervical spine. 
 
_______________ FINDINGS: 
 
VERTEBRAE AND ALIGNMENT: C1-C2 relationship is within normal limits. Postoperative changes from cervical disc replacement at C5-C6. Vertebral body 
heights are maintained. In the neutral position, there is mild straightening of 
usual cervical lordosis. With flexion and extension, normal motion is identified 
without evidence of stability. DISC SPACES/FACETS: Intervertebral disc heights appear preserved. No significant 
neuroforaminal encroachment. SOFT TISSUES: No abnormal prevertebral soft tissue swelling. OTHER: Clear lung apices. _______________ Impression IMPRESSION: 
 
 
1. Prior cervical disc replacement C5-C6. Mild straightening of usual cervical 
lordosis without evidence of instability across flexion or extension. Results from Appointment encounter on 09/09/19 XR CHEST PA LAT Narrative Chest, two views Indication: Shortness of breath and cough with intermittent chest pain for one 
year. Former smoker, quit one week ago. History of hyperlipidemia, GERD, 
arthritis. Comparison: None Findings:  Upright PA and lateral views of the chest were obtained. The 
cardiomediastinal silhouette is within normal limits. The pulmonary vasculature 
is unremarkable. Lung parenchyma is well aerated, without focal consolidation. No pleural effusion nor pneumothorax. No acute osseous abnormality. Impression Impression: No radiographic evidence of an acute abnormality. Lab Results Component Value Date/Time WBC 7.9 01/26/2021 01:47 PM  
 HGB 13.9 01/26/2021 01:47 PM  
 HCT 39.2 01/26/2021 01:47 PM  
 PLATELET 077 55/94/0011 01:47 PM  
 MCV 90 01/26/2021 01:47 PM  
 
 
Lab Results Component Value Date/Time  Sodium 140 01/26/2021 01:47 PM  
 Potassium 4.4 01/26/2021 01:47 PM  
 Chloride 100 01/26/2021 01:47 PM  
 CO2 21 01/26/2021 01:47 PM  
 Glucose 92 01/26/2021 01:47 PM  
 BUN 8 01/26/2021 01:47 PM  
 Creatinine 0.64 01/26/2021 01:47 PM  
 BUN/Creatinine ratio 13 01/26/2021 01:47 PM  
 GFR est  01/26/2021 01:47 PM  
 GFR est non- 01/26/2021 01:47 PM  
 Calcium 9.8 01/26/2021 01:47 PM  
 
 
Lab Results Component Value Date/Time Cholesterol, total 242 (H) 01/26/2021 01:47 PM  
 HDL Cholesterol 41 01/26/2021 01:47 PM  
 LDL, calculated 131 (H) 01/26/2021 01:47 PM  
 VLDL, calculated 70 (H) 01/26/2021 01:47 PM  
 Triglyceride 389 (H) 01/26/2021 01:47 PM  
 
Lab Results Component Value Date/Time VITAMIN D, 25-HYDROXY 44.5 01/26/2021 01:47 PM  
   
 
Lab Results Component Value Date/Time Vitamin B12 739 01/26/2021 01:47 PM  
 Folate >20.0 01/26/2021 01:47 PM  
 
 
Lab Results Component Value Date/Time Iron 156 01/26/2021 01:47 PM  
 TIBC 397 01/26/2021 01:47 PM  
 Iron % saturation 39 01/26/2021 01:47 PM  
 Ferritin 57 01/26/2021 01:47 PM  
 
 
Lab Results Component Value Date/Time C-Reactive Protein, Qt 4 01/26/2021 01:47 PM  
 C-Reactive Protein, Qt 4 01/26/2021 01:47 PM  
 
 
Lab Results Component Value Date/Time Sed rate (ESR) 49 (H) 01/26/2021 01:47 PM  
 Sed rate (ESR) 49 (H) 01/26/2021 01:47 PM  
 
 
Lab Results Component Value Date/Time Rheumatoid factor <10.0 01/26/2021 01:47 PM  
 
 
[facility] Antibodies IgG/IgA Date Value Ref Range Status 01/26/2021 8 0 - 19 units Final  
  Comment:  
                            Negative               <20 Weak positive      20 - 39 Moderate positive  40 - 59 Strong positive        >59 
  
01/26/2021 6 0 - 19 units Final  
  Comment:  
                            Negative               <20 Weak positive      20 - 39 Moderate positive  40 - 59 Strong positive        >59 Uric acid Date Value Ref Range Status 01/26/2021 5.6 2.6 - 6.2 mg/dL Final  
  Comment:  
             Therapeutic target for gout patients: <6.0 Lab Results Component Value Date/Time  Anti-DNA (DS) Ab, QT 10 (H) 01/26/2021 01:47 PM  
 RNP Abs 0.9 01/26/2021 01:47 PM  
 Wu Abs <0.2 01/26/2021 01:47 PM  
 Scleroderma-70 Ab <0.2 01/26/2021 01:47 PM  
 Sjogren's Anti-SS-A <0.2 01/26/2021 01:47 PM  
 Sjogren's Anti-SS-B <0.2 01/26/2021 01:47 PM  
 Antichromatin Ab <0.2 01/26/2021 01:47 PM  
 Anti-Herlinda-1 <0.2 01/26/2021 01:47 PM  
 Centromere B Ab <0.2 01/26/2021 01:47 PM

## 2021-03-05 NOTE — PROGRESS NOTES
Geralyn Gottron is a 39 y.o. female (: 1980) presenting to address:    Chief Complaint   Patient presents with    Back Pain     x 2 months     Neck Pain     x many years       Vitals:    21 1253   BP: 124/81   Pulse: 79   Resp: 16   Temp: 97.9 °F (36.6 °C)   TempSrc: Temporal   SpO2: 99%   Weight: 159 lb 3.2 oz (72.2 kg)   Height: 5' 3.5\" (1.613 m)   PainSc:   4   PainLoc: Back   LMP: 2021       Hearing/Vision:   No exam data present    Learning Assessment:     Learning Assessment 2019   PRIMARY LEARNER Patient   HIGHEST LEVEL OF EDUCATION - PRIMARY LEARNER  2 YEARS OF COLLEGE   BARRIERS PRIMARY LEARNER NONE   PRIMARY LANGUAGE ENGLISH    NEED No   LEARNER PREFERENCE PRIMARY DEMONSTRATION   ANSWERED BY patient   RELATIONSHIP SELF     Depression Screening:     3 most recent PHQ Screens 3/5/2021   PHQ Not Done -   Little interest or pleasure in doing things More than half the days   Feeling down, depressed, irritable, or hopeless Several days   Total Score PHQ 2 3     Fall Risk Assessment:     Fall Risk Assessment, last 12 mths 3/5/2021   Able to walk? Yes   Fall in past 12 months? 0   Do you feel unsteady? 0   Are you worried about falling 0     Abuse Screening:     Abuse Screening Questionnaire 2021   Do you ever feel afraid of your partner? N   Are you in a relationship with someone who physically or mentally threatens you? N   Is it safe for you to go home? Y     Coordination of Care Questionaire:   1. Have you been to the ER, urgent care clinic since your last visit? Hospitalized since your last visit? NO    2. Have you seen or consulted any other health care providers outside of the [address] [place] [address] since your last visit? Include any pap smears or colon screening. NO    Advanced Directive:   1. Do you have an Advanced Directive? NO    2. Would you like information on Advanced Directives?  NO

## 2021-03-05 NOTE — PATIENT INSTRUCTIONS
Get the really cute ailyn iron fish. We are going to do an experiment - prednisone 20mg daily for 10 days. Please monitor what joints feel better / less morning stiffness / swelling. Do NOT take mobic while on prednisone. Also please take prednisone with food, and in the morning. Flexeril at night particularly because it makes your sleepy. If you want to pursue other muscle relaxants that (a) work, and (b) don't make you sleepy, let me know. I am going to work on getting you MRI of L hip (+/- lumbar spine). Ask rheum to fax me copy of note to [phone]      TESTING RESULTS       If you have [employer] access:   Per federal regulations as of October 20th, 2020, all of your results will be released to you and to your physician / provider simultaneously on [address]    o This means that it is likely that you will have the opportunity to review your results before your physician / provider!  Since all \"critical\" abnormal results are immediately called to the office / on-call providers on nights, weekends and holidays - please refrain from calling after hours when you receive abnormal results through [address] Instead, allow time for your physician / provider to review your results and then provide interpretation and/or guidance.  If the results are significantly abnormal and require time-sensitive action - guidance will be provided both via [employer] and via telephone.  For all other results (interpreted as \"normal\", \"abnormal but expected\", \"reassuring / stable\", \"slightly abnormal\") - non-urgent guidance will be provided via [employer] communication only. Lazaro  #[phone]      If you do NOT have [employer] access:   If the results are significantly abnormal and require time-sensitive action - guidance will be relayed to you via telephone.    For all other results (interpreted as \"normal\", \"abnormal but expected\", \"reassuring / stable\", \"slightly abnormal\") - non-urgent guidance will be mailed to you via [employer]      Results from Outside Facilities / Laboratories:  Results of tests performed at outside facilities / laboratories likely will not appear in the [employer].  o They may appear in the patient portals of those outside facilities / laboratories. o Please keep in mind that with your access to your patient portal directly to an outside facility / laboratory, you are likely viewing results before your physician / provider! Please allow time for your physician / provider to review your results and then provide interpretation and/or guidance. If you have questions about your results beyond the guidance provided in MyChart or in your results letter, please schedule a follow up appointment to discuss with your physician / provider. MEDICATION REFILLS         Please request medication refills through your pharmacy, to ensure the correct pharmacy is used.  Please allow at least 2 business days for refill requests to be addressed.  Refills will not be provided by the after hours/on call provider.              Lab Results   Component Value Date/Time    WBC 7.9 01/26/2021 01:47 PM    HGB 13.9 01/26/2021 01:47 PM    HCT 39.2 01/26/2021 01:47 PM    PLATELET 244 01/95/6400 01:47 PM    MCV 90 01/26/2021 01:47 PM       Lab Results   Component Value Date/Time    Sodium 140 01/26/2021 01:47 PM    Potassium 4.4 01/26/2021 01:47 PM    Chloride 100 01/26/2021 01:47 PM    CO2 21 01/26/2021 01:47 PM    Glucose 92 01/26/2021 01:47 PM    BUN 8 01/26/2021 01:47 PM    Creatinine 0.64 01/26/2021 01:47 PM    BUN/Creatinine ratio 13 01/26/2021 01:47 PM    GFR est  01/26/2021 01:47 PM    GFR est non- 01/26/2021 01:47 PM    Calcium 9.8 01/26/2021 01:47 PM       Lab Results   Component Value Date/Time    Cholesterol, total 242 (H) 01/26/2021 01:47 PM    HDL Cholesterol 41 01/26/2021 01:47 PM    LDL, calculated 131 (H) 01/26/2021 01:47 PM    VLDL, calculated 70 (H) 01/26/2021 01:47 PM    Triglyceride 389 (H) 01/26/2021 01:47 PM     Lab Results   Component Value Date/Time    VITAMIN D, 25-HYDROXY 44.5 01/26/2021 01:47 PM         Lab Results   Component Value Date/Time    Vitamin B12 739 01/26/2021 01:47 PM    Folate >20.0 01/26/2021 01:47 PM       Lab Results   Component Value Date/Time    Iron 156 01/26/2021 01:47 PM    TIBC 397 01/26/2021 01:47 PM    Iron % saturation 39 01/26/2021 01:47 PM    Ferritin 57 01/26/2021 01:47 PM       Lab Results   Component Value Date/Time    C-Reactive Protein, Qt 4 01/26/2021 01:47 PM       Lab Results   Component Value Date/Time    Sed rate (ESR) 49 (H) 01/26/2021 01:47 PM       Lab Results   Component Value Date/Time    Rheumatoid factor <10.0 01/26/2021 01:47 PM       CCP Antibodies IgG/IgA   Date Value Ref Range Status   01/26/2021 8 0 - 19 units Final     Comment:                               Negative               <20                            Weak positive      20 - 39                            Moderate positive  40 - 59                            Strong positive        >59     01/26/2021 6 0 - 19 units Final     Comment:                               Negative               <20                            Weak positive      20 - 39                            Moderate positive  40 - 59                            Strong positive        >59       Uric acid   Date Value Ref Range Status   01/26/2021 5.6 2.6 - 6.2 mg/dL Final     Comment:                Therapeutic target for gout patients: <6.0       Lab Results   Component Value Date/Time    Anti-DNA (DS) Ab, QT 10 (H) 01/26/2021 01:47 PM    RNP Abs 0.9 01/26/2021 01:47 PM    Wu Abs <0.2 01/26/2021 01:47 PM    Scleroderma-70 Ab <0.2 01/26/2021 01:47 PM    Sjogren's Anti-SS-A <0.2 01/26/2021 01:47 PM    Sjogren's Anti-SS-B <0.2 01/26/2021 01:47 PM    Antichromatin Ab <0.2 01/26/2021 01:47 PM    Anti-Herlinda-1 <0.2 01/26/2021 01:47 PM    Centromere B Ab <0.2 01/26/2021 01:47 PM XR Results (maximum last 3): Results from Appointment encounter on 02/20/20   XR SPINE LUMB COMP W BEND    Narrative Lumbar spine series with flexion-extension views    Indication: Chronic low back pain. Findings:    AP, lateral, and bilateral oblique views of the lumbar spine are submitted for  evaluation along with flexion and extension views. There is slight left  convexity at L4 vertebral body. There is no evidence of fracture or subluxation. No evidence for instability with flexion or extension. The disc spaces are  normal in height. The paravertebral soft tissue structures are unremarkable. Osseous mineralization is radiographically normal.      Impression Impression:    Slight left convexity. No evidence of fracture, subluxation, or instability. XR SPINE CERV W OBL/FLEX/EXT MIN 6 V COMP    Narrative EXAM: CERVICAL SPINE RADIOGRAPHS    CLINICAL INDICATION/HISTORY: Cervical neck pain  -Additional: None    COMPARISON: None    TECHNIQUE: Open-mouth odontoid, lateral neutral, lateral flexion, lateral  extension, and bilateral oblique views of the cervical spine.    _______________    FINDINGS:    VERTEBRAE AND ALIGNMENT: C1-C2 relationship is within normal limits. Postoperative changes from cervical disc replacement at C5-C6. Vertebral body  heights are maintained. In the neutral position, there is mild straightening of  usual cervical lordosis. With flexion and extension, normal motion is identified  without evidence of stability. DISC SPACES/FACETS: Intervertebral disc heights appear preserved. No significant  neuroforaminal encroachment. SOFT TISSUES: No abnormal prevertebral soft tissue swelling. OTHER: Clear lung apices. _______________      Impression IMPRESSION:      1. Prior cervical disc replacement C5-C6. Mild straightening of usual cervical  lordosis without evidence of instability across flexion or extension.    Results from Appointment encounter on 09/09/19   XR CHEST PA LAT    Narrative Chest, two views    Indication: Shortness of breath and cough with intermittent chest pain for one  year. Former smoker, quit one week ago. History of hyperlipidemia, GERD,  arthritis. Comparison: None    Findings:  Upright PA and lateral views of the chest were obtained. The  cardiomediastinal silhouette is within normal limits. The pulmonary vasculature  is unremarkable. Lung parenchyma is well aerated, without focal consolidation. No pleural effusion nor pneumothorax. No acute osseous abnormality. Impression Impression:  No radiographic evidence of an acute abnormality.

## 2021-04-05 ENCOUNTER — HOSPITAL ENCOUNTER (OUTPATIENT)
Dept: MAMMOGRAPHY | Age: 41
Discharge: HOME | End: 2021-04-05
Payer: OTHER GOVERNMENT

## 2021-04-05 DIAGNOSIS — Z12.31 BREAST CANCER SCREENING BY MAMMOGRAM: ICD-10-CM

## 2021-04-05 PROCEDURE — 77067 SCR MAMMO BI INCL CAD: CPT

## 2021-05-05 NOTE — TELEPHONE ENCOUNTER
Last seen 3/5/21    Last filled 11/20/20 qty 90 w/ 2 refills    Future Appointments   Date Time Provider Jp Jalloh   5/24/2021  1:30 PM Karina Hardy MD [facility]

## 2021-05-10 RX ORDER — CYCLOBENZAPRINE HCL 10 MG
10 TABLET ORAL
Qty: 90 TAB | Refills: 3 | Status: SHIPPED | OUTPATIENT
Start: 2021-05-10 | End: 2021-08-17 | Stop reason: SDUPTHER

## 2021-05-24 ENCOUNTER — OFFICE VISIT (OUTPATIENT)
Dept: FAMILY MEDICINE CLINIC | Age: 41
End: 2021-05-24
Payer: OTHER GOVERNMENT

## 2021-05-24 ENCOUNTER — APPOINTMENT (OUTPATIENT)
Dept: FAMILY MEDICINE CLINIC | Age: 41
End: 2021-05-24

## 2021-05-24 VITALS
RESPIRATION RATE: 16 BRPM | HEART RATE: 101 BPM | TEMPERATURE: 97.9 F | DIASTOLIC BLOOD PRESSURE: 74 MMHG | BODY MASS INDEX: 26.73 KG/M2 | HEIGHT: 64 IN | SYSTOLIC BLOOD PRESSURE: 109 MMHG | OXYGEN SATURATION: 97 % | WEIGHT: 156.6 LBS

## 2021-05-24 DIAGNOSIS — R74.8 ELEVATED LIVER ENZYMES: ICD-10-CM

## 2021-05-24 DIAGNOSIS — G89.29 CHRONIC LEFT SHOULDER PAIN: Primary | ICD-10-CM

## 2021-05-24 DIAGNOSIS — G89.29 CHRONIC BILATERAL LOW BACK PAIN WITH BILATERAL SCIATICA: ICD-10-CM

## 2021-05-24 DIAGNOSIS — G89.29 CHRONIC LEFT HIP PAIN: ICD-10-CM

## 2021-05-24 DIAGNOSIS — M25.552 CHRONIC LEFT HIP PAIN: ICD-10-CM

## 2021-05-24 DIAGNOSIS — M54.41 CHRONIC BILATERAL LOW BACK PAIN WITH BILATERAL SCIATICA: ICD-10-CM

## 2021-05-24 DIAGNOSIS — M54.42 CHRONIC BILATERAL LOW BACK PAIN WITH BILATERAL SCIATICA: ICD-10-CM

## 2021-05-24 DIAGNOSIS — M25.50 POLYARTHRALGIA: ICD-10-CM

## 2021-05-24 DIAGNOSIS — R76.8 DS DNA ANTIBODY POSITIVE: ICD-10-CM

## 2021-05-24 DIAGNOSIS — M25.512 CHRONIC LEFT SHOULDER PAIN: Primary | ICD-10-CM

## 2021-05-24 DIAGNOSIS — R70.0 ESR RAISED: ICD-10-CM

## 2021-05-24 PROCEDURE — 99215 OFFICE O/P EST HI 40 MIN: CPT | Performed by: FAMILY MEDICINE

## 2021-05-24 RX ORDER — DOXEPIN HYDROCHLORIDE 25 MG/1
50 CAPSULE ORAL
COMMUNITY
Start: 2021-04-30 | End: 2021-05-24 | Stop reason: SDUPTHER

## 2021-05-24 NOTE — Clinical Note
Please (1) fax MRI order to [facility], and (2) call patient to inform her that the order was sent over so she can arrange scheduling (she already has instructions for that since she has to schedule her other 2 MRIs still). Thanks.

## 2021-05-24 NOTE — PATIENT INSTRUCTIONS
[facility] central scheduling should call you within the 5-7 business days to schedule your LEFT hip and lumbar spine MRIs. However, if you have not received a phone call from them in 10 business days, call them @ [phone]. Continue flexeril at night. Maybe consider using it during the day for your scapular stabilization discomfort. X-ray LEFT shoulder today.  > I will have my nurse give you a call once I get those results back, and let you know about ordering a LEFT shoulder MRI. Dr. Samantha So will continue with [address] [place] [address] as a Sports Medicine Specialist      After July 1st, I will continue to serve the [facility] as a Sports Medicine Physician. My sports medicine practice will offer expertise in the non-operative treatment of acute and chronic musculoskeletal conditions, as well as non-musculoskeletal aspects of sports medicine for patients 12 years and older. Common examples of non-musculoskeletal sports medicine include: concussion (mild traumatic brain injury), exercise prescription, injury prevention, and return to [facility] decisions for the , the [employer] and the [employer]. I hope that you and your loved ones will keep me in mind for your sports medicine needs. Before July 1st:   After July 1st: [phone]    TESTING RESULTS       If you have MyChart access:   Per federal regulations as of October 20th, 2020, all of your results will be released to you and to your physician / provider simultaneously on [address]    o This means that it is likely that you will have the opportunity to review your results before your physician / provider!  Since all \"critical\" abnormal results are immediately called to the office / on-call providers on nights, weekends and holidays - please refrain from calling after hours when you receive abnormal results through [address]   Instead, allow time for your physician / provider to review your results and then provide interpretation and/or guidance.  If the results are significantly abnormal and require time-sensitive action - guidance will be provided both via [employer] and via telephone.  For all other results (interpreted as \"normal\", \"abnormal but expected\", \"reassuring / stable\", \"slightly abnormal\") - non-urgent guidance will be provided via [employer] communication only. Lazaro  #[phone]      If you do NOT have [employer] access:   If the results are significantly abnormal and require time-sensitive action - guidance will be relayed to you via telephone.  For all other results (interpreted as \"normal\", \"abnormal but expected\", \"reassuring / stable\", \"slightly abnormal\") - non-urgent guidance will be mailed to you via [employer]      Results from Outside Facilities / Laboratories:  Results of tests performed at outside facilities / laboratories likely will not appear in the [employer].  o They may appear in the patient portals of those outside facilities / laboratories. o Please keep in mind that with your access to your patient portal directly to an outside facility / laboratory, you are likely viewing results before your physician / provider! Please allow time for your physician / provider to review your results and then provide interpretation and/or guidance. If you have questions about your results beyond the guidance provided in MyChart or in your results letter, please schedule a follow up appointment to discuss with your physician / provider. MEDICATION REFILLS         Please request medication refills through your pharmacy, to ensure the correct pharmacy is used.  Please allow at least 2 business days for refill requests to be addressed.  Refills will not be provided by the after hours/on call provider.

## 2021-05-24 NOTE — PROGRESS NOTES
Tod Hewitt is a 39 y.o. female (: 1980) presenting to address:    Chief Complaint   Patient presents with    Hip Pain     Left hip     Back Pain    Neck Pain     and left shoulder pain that radiates down back and arm       Vitals:    21 1322   BP: 109/74   Pulse: (!) 101   Resp: 16   Temp: 97.9 °F (36.6 °C)   TempSrc: Temporal   SpO2: 97%   Weight: 156 lb 9.6 oz (71 kg)   Height: 5' 3.5\" (1.613 m)   PainSc:   4   LMP: 2021       Hearing/Vision:   No exam data present    Learning Assessment:     Learning Assessment 2019   PRIMARY LEARNER Patient   HIGHEST LEVEL OF EDUCATION - PRIMARY LEARNER  2 YEARS OF COLLEGE   BARRIERS PRIMARY LEARNER NONE   PRIMARY LANGUAGE ENGLISH    NEED No   LEARNER PREFERENCE PRIMARY DEMONSTRATION   ANSWERED BY patient   RELATIONSHIP SELF     Depression Screening:     3 most recent PHQ Screens 2021   PHQ Not Done -   Little interest or pleasure in doing things Not at all   Feeling down, depressed, irritable, or hopeless Not at all   Total Score PHQ 2 0   Trouble falling or staying asleep, or sleeping too much Not at all   Feeling tired or having little energy Not at all   Poor appetite, weight loss, or overeating Not at all   Feeling bad about yourself - or that you are a failure or have let yourself or your family down Not at all   Trouble concentrating on things such as school, work, reading, or watching TV Not at all   Moving or speaking so slowly that other people could have noticed; or the opposite being so fidgety that others notice Not at all   Thoughts of being better off dead, or hurting yourself in some way Not at all   PHQ 9 Score 0   How difficult have these problems made it for you to do your work, take care of your home and get along with others Not difficult at all     Fall Risk Assessment:     Fall Risk Assessment, last 12 mths 3/5/2021   Able to walk? Yes   Fall in past 12 months? 0   Do you feel unsteady?  0   Are you worried about falling 0     Abuse Screening:     Abuse Screening Questionnaire 1/26/2021   Do you ever feel afraid of your partner? N   Are you in a relationship with someone who physically or mentally threatens you? N   Is it safe for you to go home? Y     ADL Assessment:   No flowsheet data found. Coordination of Care Questionaire:   1. Have you been to the ER, urgent care clinic since your last visit? Hospitalized since your last visit? NO    2. Have you seen or consulted any other health care providers outside of the [address] [place] [address] since your last visit? Include any pap smears or colon screening. YES 5/14/21 Dr. Shan Gibson    Advanced Directive:   1. Do you have an Advanced Directive? NO    2. Would you like information on Advanced Directives?  NO

## 2021-05-25 LAB
ALBUMIN SERPL-MCNC: 4.6 G/DL (ref 3.8–4.8)
ALP SERPL-CCNC: 108 IU/L (ref 48–121)
ALT SERPL-CCNC: 65 IU/L (ref 0–32)
AST SERPL-CCNC: 22 IU/L (ref 0–40)
BILIRUB DIRECT SERPL-MCNC: 0.09 MG/DL (ref 0–0.4)
BILIRUB SERPL-MCNC: 0.2 MG/DL (ref 0–1.2)
PROT SERPL-MCNC: 7.6 G/DL (ref 6–8.5)

## 2021-05-26 DIAGNOSIS — R74.8 ELEVATED LIVER ENZYMES: Primary | ICD-10-CM

## 2021-05-26 NOTE — PROGRESS NOTES
ALT is still mildly elevated @ 65, most likely 2/2 fatty liver, discussed with pt, will refer to gastroenterology

## 2021-07-15 DIAGNOSIS — M54.41 CHRONIC BILATERAL LOW BACK PAIN WITH BILATERAL SCIATICA: ICD-10-CM

## 2021-07-15 DIAGNOSIS — G89.29 CHRONIC LEFT HIP PAIN: ICD-10-CM

## 2021-07-15 DIAGNOSIS — G89.29 CHRONIC LEFT SHOULDER PAIN: ICD-10-CM

## 2021-07-15 DIAGNOSIS — M25.552 CHRONIC LEFT HIP PAIN: ICD-10-CM

## 2021-07-15 DIAGNOSIS — G89.29 CHRONIC BILATERAL LOW BACK PAIN WITH BILATERAL SCIATICA: ICD-10-CM

## 2021-07-15 DIAGNOSIS — M25.512 CHRONIC LEFT SHOULDER PAIN: ICD-10-CM

## 2021-07-15 DIAGNOSIS — M54.42 CHRONIC BILATERAL LOW BACK PAIN WITH BILATERAL SCIATICA: ICD-10-CM

## 2021-07-28 ENCOUNTER — OFFICE VISIT (OUTPATIENT)
Dept: HEMATOLOGY | Age: 41
End: 2021-07-28
Payer: OTHER GOVERNMENT

## 2021-07-28 ENCOUNTER — HOSPITAL ENCOUNTER (OUTPATIENT)
Dept: LAB | Age: 41
Discharge: HOME | End: 2021-07-28
Payer: OTHER GOVERNMENT

## 2021-07-28 VITALS
TEMPERATURE: 96.9 F | HEART RATE: 95 BPM | RESPIRATION RATE: 17 BRPM | DIASTOLIC BLOOD PRESSURE: 87 MMHG | OXYGEN SATURATION: 98 % | WEIGHT: 155 LBS | BODY MASS INDEX: 25.83 KG/M2 | SYSTOLIC BLOOD PRESSURE: 117 MMHG | HEIGHT: 65 IN

## 2021-07-28 DIAGNOSIS — R74.8 ELEVATED LIVER ENZYMES: ICD-10-CM

## 2021-07-28 DIAGNOSIS — R74.8 ELEVATED LIVER ENZYMES: Primary | ICD-10-CM

## 2021-07-28 LAB
ALBUMIN SERPL-MCNC: 4.1 G/DL (ref 3.4–5)
ALBUMIN/GLOB SERPL: 1.1 {RATIO} (ref 0.8–1.7)
ALP SERPL-CCNC: 101 U/L (ref 45–117)
ALT SERPL-CCNC: 81 U/L (ref 13–56)
ANION GAP SERPL CALC-SCNC: 6 MMOL/L (ref 3–18)
AST SERPL-CCNC: 36 U/L (ref 10–38)
BASOPHILS # BLD: 0 K/UL (ref 0–0.1)
BASOPHILS NFR BLD: 1 % (ref 0–2)
BILIRUB DIRECT SERPL-MCNC: <0.1 MG/DL (ref 0–0.2)
BILIRUB SERPL-MCNC: 0.3 MG/DL (ref 0.2–1)
BUN SERPL-MCNC: 8 MG/DL (ref 7–18)
BUN/CREAT SERPL: 13 (ref 12–20)
CALCIUM SERPL-MCNC: 9.3 MG/DL (ref 8.5–10.1)
CHLORIDE SERPL-SCNC: 105 MMOL/L (ref 100–111)
CO2 SERPL-SCNC: 27 MMOL/L (ref 21–32)
CREAT SERPL-MCNC: 0.61 MG/DL (ref 0.6–1.3)
DIFFERENTIAL METHOD BLD: ABNORMAL
EOSINOPHIL # BLD: 0.1 K/UL (ref 0–0.4)
EOSINOPHIL NFR BLD: 2 % (ref 0–5)
ERYTHROCYTE [DISTWIDTH] IN BLOOD BY AUTOMATED COUNT: 12.4 % (ref 11.6–14.5)
FERRITIN SERPL-MCNC: 47 NG/ML (ref 8–388)
GLOBULIN SER CALC-MCNC: 3.6 G/DL (ref 2–4)
GLUCOSE SERPL-MCNC: 106 MG/DL (ref 74–99)
HCT VFR BLD AUTO: 38.5 % (ref 35–45)
HGB BLD-MCNC: 12.9 G/DL (ref 12–16)
IRON SATN MFR SERPL: 20 % (ref 20–50)
IRON SERPL-MCNC: 83 UG/DL (ref 50–175)
LYMPHOCYTES # BLD: 2.4 K/UL (ref 0.9–3.6)
LYMPHOCYTES NFR BLD: 28 % (ref 21–52)
MCH RBC QN AUTO: 31.9 PG (ref 24–34)
MCHC RBC AUTO-ENTMCNC: 33.5 G/DL (ref 31–37)
MCV RBC AUTO: 95.1 FL (ref 74–97)
MONOCYTES # BLD: 0.5 K/UL (ref 0.05–1.2)
MONOCYTES NFR BLD: 6 % (ref 3–10)
NEUTS SEG # BLD: 5.4 K/UL (ref 1.8–8)
NEUTS SEG NFR BLD: 64 % (ref 40–73)
PLATELET # BLD AUTO: 308 K/UL (ref 135–420)
PMV BLD AUTO: 10 FL (ref 9.2–11.8)
POTASSIUM SERPL-SCNC: 3.9 MMOL/L (ref 3.5–5.5)
PROT SERPL-MCNC: 7.7 G/DL (ref 6.4–8.2)
RBC # BLD AUTO: 4.05 M/UL (ref 4.2–5.3)
SODIUM SERPL-SCNC: 138 MMOL/L (ref 136–145)
TIBC SERPL-MCNC: 406 UG/DL (ref 250–450)
WBC # BLD AUTO: 8.5 K/UL (ref 4.6–13.2)

## 2021-07-28 PROCEDURE — 83540 ASSAY OF IRON: CPT

## 2021-07-28 PROCEDURE — 80048 BASIC METABOLIC PNL TOTAL CA: CPT

## 2021-07-28 PROCEDURE — 86704 HEP B CORE ANTIBODY TOTAL: CPT

## 2021-07-28 PROCEDURE — 82728 ASSAY OF FERRITIN: CPT

## 2021-07-28 PROCEDURE — 86708 HEPATITIS A ANTIBODY: CPT

## 2021-07-28 PROCEDURE — 36415 COLL VENOUS BLD VENIPUNCTURE: CPT

## 2021-07-28 PROCEDURE — 99204 OFFICE O/P NEW MOD 45 MIN: CPT | Performed by: NURSE PRACTITIONER

## 2021-07-28 PROCEDURE — 80076 HEPATIC FUNCTION PANEL: CPT

## 2021-07-28 PROCEDURE — 86706 HEP B SURFACE ANTIBODY: CPT

## 2021-07-28 PROCEDURE — 85025 COMPLETE CBC W/AUTO DIFF WBC: CPT

## 2021-07-28 PROCEDURE — 86038 ANTINUCLEAR ANTIBODIES: CPT

## 2021-07-28 PROCEDURE — 86803 HEPATITIS C AB TEST: CPT

## 2021-07-28 PROCEDURE — 83516 IMMUNOASSAY NONANTIBODY: CPT

## 2021-07-28 PROCEDURE — 87340 HEPATITIS B SURFACE AG IA: CPT

## 2021-07-28 PROCEDURE — 86256 FLUORESCENT ANTIBODY TITER: CPT

## 2021-07-28 NOTE — PROGRESS NOTES
[address] [facility]      Raymundo Grant MD, Lauren Ferrell, Kd Londono MD, MPH      Pamella Calderón, PA-FADY Toth, ACNP-BC     April S Pushpa, PCNP-BC   Rajalejandra Purdy FNP-C    Tone Sosa, [facility]-BC       Freida George [facility] 136    at [address], [address] [place] [address], [facility] 22.    [phone]    FAX: [address] [place] [address], [address]    [phone]    FAX: [phone]       Patient Care Team:  Arely Tejeda MD as PCP - General (Internal Medicine)  Arely Tejeda MD as PCP - [facility] Provider  Marisa Dewitt MD (Neurology)  Justen Burleson MD (Sports Medicine)  Larissa Taylor DO (Rheumatology)      Problem List  Date Reviewed: 5/24/2021        Codes Class Noted    Depression ICD-10-CM: F32.9  ICD-9-CM: 311  Unknown        Chronic neck pain ICD-10-CM: M54.2, G89.29  ICD-9-CM: 723.1, 338.29  1/26/2021        S/P diskectomy ICD-10-CM: M73.073  ICD-9-CM: V45.89  1/21/2020        Chronic back pain ICD-10-CM: M54.9, G89.29  ICD-9-CM: 724.5, 338.29  1/21/2020        Chronic pain of both knees ICD-10-CM: M25.561, M25.562, G89.29  ICD-9-CM: 719.46, 338.29  5/2/2019        Pain in both hands ICD-10-CM: M79.641, M79.642  ICD-9-CM: 729.5  5/2/2019        Arthralgia ICD-10-CM: M25.50  ICD-9-CM: 719.40  5/2/2019        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 All medical records sent by the referring physicians were reviewed including imaging studies     The patient is a 39 y.o. female who is suspected to have fatty liver disease based upon ultrasound. The most recent laboratory studies indicate that the AST is normal, ALT is normal, alkaline phosphatase is normal, tests of hepatic synthetic and metabolic function are normal,   and the platelet count is normal.      Serologic evaluation for markers of chronic liver disease was positive for ASMA. The most recent imaging of the liver was Ultrasound performed in 2/2020. Results suggest fatty liver disease. No liver mass lesions noted. An assessment of liver fibrosis with biopsy or elastography has not been performed. The patient had not started any new medications within 3 months preceding the elevation in liver chemistries. The patient has the following symptoms which could be attributed to the liver disorder:  pain in the right side over the liver. The patient is not experiencing the following symptoms which are commonly seen in this liver disorder: fatigue. The patient completes all daily activities without any functional limitations. ASSESSMENT AND PLAN:  Fatty liver  Suspect the patient has fatty liver based upon imaging, serologic studies that are negative for other causes of chronic liver disease,     A liver biopsy has not been performed. NAFL is a benign form of fatty liver disease and not thought to progress to fibrosis or cirrhosis. AST is normal. ALT is elevated. ALP is normal. Liver function is normal.  The platelet count is normal.      Based upon laboratory studies and imaging the patient does not appear to have significant liver injury. Have performed laboratory testing to monitor liver function and degree of liver injury. This included BMP, hepatic panel, CBC with platelet count.     Serologic testing for causes of chronic liver disease was ordered. Positive ASMA. Only a liver biopsy can confirm if the patient does have fatty liver and differentiate NAFL from FERNÁNDEZ. The treatment is the same; weight reduction. If the liver biopsy demonstrates FERNÁNDEZ the patient could be eligible for enrollment into clinical trials for treatment of FERNÁNDEZ. There is no reason to perform liver biopsy at this time. Liver chemistries will be monitored. If the liver enzymes remain persistently elevated over the next 1-2 years a liver biopsy should be performed to ensure there is no ongoing chronic liver disease. The patient has a normal or near normal BMI and can not possibly loose sufficient weight to resolve NAFLD. There is currently no FDA approved medical treatment for fatty liver, NALFD or FERNÁNDEZ. The only medical treatments for FERNÁNDEZ are through clinical trials. The patient would be a good candidate for enrollment into a clinical trial if found to have FERNÁNDEZ. Since a liver biopsy was not performed it is possible the patient may not have fatty liver or this may not be contributing to the elevation in liver enzymes. Positive serology for autoimmune liver disease  The positive ASMA suggests that there the may be an underlying autoimmune liver disease. Given that the liver enzymes are near normal it is unlikely there is an autoimmune liver disorder. Will continue to monitor to see if the liver enzymes remain normal, fluctuate or become persistently elevated. The patient does not appear to have an autoimmune liver disease. Will continue to monitor liver enzymes. Counseling for diet and weight loss in patients with confirmed or suspected NAFLD  The patient was counseled regarding diet and exercise to achieve weight loss.   The best diet for patients with fatty liver is one very low in carbohydrates and enriched with protein such as an [employer]iyana's program.      The patient was told not to consume any food products and drinks containing fructose as this enhances hepatic fat synthesis. There is no medication or vitamin supplements that we advocate for FERNÁNDEZ. Using glitazones in patients without diabetes mellitus has been shown to reduce fat content in the liver but has no effect on fibrosis and is associated with weight gain. Vitamin E has also been used but the data is not very good and most experts no longer advocate this. Screening for Hepatocellular Carcinoma  HCC screening is not necessary if the patient has no evidence of cirrhosis. Treatment of other medical problems in patients with chronic liver disease  There are no contraindications for the patient to take most medications that are necessary for treatment of other medical issues. The patient can take any medications utilized for treatment of DM, statins to treat hypercholesterolemia    Normal doses of acetaminophen, as recommended on the label of the bottle, are not hepatotoxic except in the setting of daily alcohol use, even in patients with cirrhosis and can be utilized for pain. Counseling for alcohol in patients with chronic liver disease  The patient was counseled regarding alcohol consumption and the effect of alcohol on chronic liver disease. The patient does not consume any significant amount of alcohol. Vaccinations   Vaccination for viral hepatitis A and B is not needed. The patient has serologic evidence of prior exposure or vaccination with immunity. Routine vaccinations against other bacterial and viral agents can be performed as indicated. Annual flu vaccination should be administered if indicated. ALLERGIES  No Known Allergies    MEDICATIONS  Current Outpatient Medications   Medication Sig    cyclobenzaprine (FLEXERIL) 10 mg tablet Take 1 Tab by mouth three (3) times daily as needed for Muscle Spasm(s).  meloxicam (Mobic) 15 mg tablet Take 1 Tab by mouth daily as needed for Pain.     multivitamin (ONE A DAY) tablet Take 1 Tab by mouth daily.  cholecalciferol (VITAMIN D3) (2,000 UNITS /50 MCG) cap capsule Take 2,000 Units by mouth daily.  omeprazole (PRILOSEC) 40 mg capsule Take 1 Cap by mouth daily. (Patient taking differently: Take 40 mg by mouth daily as needed.)    doxepin (SINEquan) 50 mg capsule Take 1 Cap by mouth nightly as needed (for sleep). No current facility-administered medications for this visit. SYSTEM REVIEW NOT RELATED TO LIVER DISEASE OR REVIEWED ABOVE:  Constitution systems: Negative for fever, chills, weight gain, weight loss. Eyes: Negative for visual changes. ENT: Negative for sore throat, painful swallowing. Respiratory: Negative for cough, hemoptysis, SOB. Cardiology: Negative for chest pain, palpitations. GI:  Negative for constipation or diarrhea. : Negative for urinary frequency, dysuria, hematuria, nocturia. Skin: Negative for rash. Hematology: Negative for easy bruising, blood clots. Musculo-skelatal: Negative for back pain, muscle pain, weakness. Neurologic: Negative for headaches, dizziness, vertigo, memory problems not related to HE. Psychology: Negative for anxiety, depression. FAMILY HISTORY:  The father Has/had the following following chronic disease(s):High cholesterol, HTN. The mother Has/had the following chronic disease(s): COPD. There is no family history of liver disease. The following family members have immune disorders: Maternal Aunt has RA, cousin has Hashimoto's    SOCIAL HISTORY:  The patient is . The patient has 2 children,    The patient currently smokes 5 cigarettes weekly   The patient has never consumed significant amounts of alcohol. The patient does not work outside the home.         PHYSICAL EXAMINATION:  Visit Vitals  /87 (BP 1 Location: Left upper arm, BP Patient Position: Sitting, BP Cuff Size: Small adult)   Pulse 95   Temp 96.9 °F (36.1 °C)   Resp 17   Ht 5' 5\" (1.651 m)   Wt 155 lb (70.3 kg)   SpO2 98%   BMI 25.79 kg/m²       General: No acute distress. Eyes: Sclera anicteric. ENT: No oral lesions. Thyroid normal.  Nodes: No adenopathy. Skin: No spider angiomata. No jaundice. No palmar erythema. Respiratory: Lungs clear to auscultation. Cardiovascular: Regular heart rate. No murmurs. No JVD. Abdomen: Soft non-tender, liver size normal to percussion/palpation. Spleen not palpable. No obvious ascites. Extremities: No edema. No muscle wasting. No gross arthritic changes. Neurologic: Alert and oriented. Cranial nerves grossly intact. No asterixis. LABORATORY STUDIES:  L[place] [address] & Units 7/28/2021 5/24/2021   WBC 4.6 - 13.2 K/uL 8.5    ANC 1.8 - 8.0 K/UL 5.4    HGB 12.0 - 16.0 g/dL 12.9     - 420 K/uL 308    AST 10 - 38 U/L 36 22   ALT 13 - 56 U/L 81 (H) 65 (H)   Alk Phos 45 - 117 U/L 101 108   Bili, Total 0.2 - 1.0 MG/DL 0.3 0.2   Bili, Direct 0.0 - 0.2 MG/DL <0.1 0.09   Albumin 3.4 - 5.0 g/dL 4.1 4.6   BUN 7.0 - 18 MG/DL 8    Creat 0.6 - 1.3 MG/DL 0.61    Na 136 - 145 mmol/L 138    K 3.5 - 5.5 mmol/L 3.9    Cl 100 - 111 mmol/L 105    CO2 21 - 32 mmol/L 27    Glucose 74 - 99 mg/dL 106 (H)        SEROLOGIES:  Serologies Latest Ref Rng & Units 7/28/2021   Hep A Ab, Total Negative   Positive (A)   Hep B Surface Ag <1.00 Index <0.10   Hep B Surface Ag Interp NEG   Negative   Hep B Core Ab, Total Negative   Negative   Hep B Surface Ab >10.0 mIU/mL 441.28   Hep B Surface Ab Interp POS   Positive   Hep C Ab 0.0 - 0.9 s/co ratio <0.1   Ferritin 8 - 388 NG/ML 47   Iron % Saturation 20 - 50 % 20   MICHEL, IFA  Negative   C-ANCA Neg:<1:20 titer <1:20   P-ANCA Neg:<1:20 titer <1:20   ANCA Neg:<1:20 titer <1:20   ASMCA 0 - 19 Units 50 (H)       LIVER HISTOLOGY:  Not available or performed    ENDOSCOPIC PROCEDURES:  Not available or performed    RADIOLOGY:  2/2020. Ultrasound of liver. Echogenic consistent with fatty liver. No liver mass lesions.  No dilated bile ducts. No ascites. OTHER TESTING:  Not available or performed    FOLLOW-UP:  All of the issues listed above in the Assessment and Plan were discussed with the patient. All questions were answered. The patient expressed a clear understanding of the above. [address] [address] in 4 weeks for [facility] to review all data and determine the treatment plan.       JAZMYNE Springer-BC  [address] [place] [address] [address]  [address] [phone]

## 2021-07-29 LAB
ACTIN IGG SERPL-ACNC: 50 UNITS (ref 0–19)
ANA TITR SER IF: NEGATIVE {TITER}
HAV AB SER QL IA: POSITIVE
HBV CORE AB SERPL QL IA: NEGATIVE
HBV SURFACE AB SER QL IA: POSITIVE
HBV SURFACE AB SERPL IA-ACNC: 441.28 MIU/ML
HBV SURFACE AG SER QL: <0.1 INDEX
HBV SURFACE AG SER QL: NEGATIVE
HEP BS AB COMMENT,HBSAC: NORMAL

## 2021-07-30 LAB
C-ANCA TITR SER IF: NORMAL TITER
HCV AB S/CO SERPL IA: <0.1 S/CO RATIO (ref 0–0.9)
HCV AB SERPL QL IA: NORMAL
P-ANCA ATYPICAL TITR SER IF: NORMAL TITER
P-ANCA TITR SER IF: NORMAL TITER

## 2021-08-17 ENCOUNTER — OFFICE VISIT (OUTPATIENT)
Dept: SPORTS MEDICINE | Age: 41
End: 2021-08-17
Payer: OTHER GOVERNMENT

## 2021-08-17 VITALS
TEMPERATURE: 98.1 F | WEIGHT: 154 LBS | DIASTOLIC BLOOD PRESSURE: 87 MMHG | HEART RATE: 80 BPM | SYSTOLIC BLOOD PRESSURE: 114 MMHG | HEIGHT: 65 IN | BODY MASS INDEX: 25.66 KG/M2

## 2021-08-17 DIAGNOSIS — M54.42 CHRONIC BILATERAL LOW BACK PAIN WITH BILATERAL SCIATICA: ICD-10-CM

## 2021-08-17 DIAGNOSIS — M25.552 CHRONIC LEFT HIP PAIN: Primary | ICD-10-CM

## 2021-08-17 DIAGNOSIS — G89.29 CHRONIC LEFT SHOULDER PAIN: ICD-10-CM

## 2021-08-17 DIAGNOSIS — M67.912 TENDINOPATHY OF LEFT ROTATOR CUFF: ICD-10-CM

## 2021-08-17 DIAGNOSIS — M25.852 FEMOROACETABULAR IMPINGEMENT OF LEFT HIP: ICD-10-CM

## 2021-08-17 DIAGNOSIS — M54.41 CHRONIC BILATERAL LOW BACK PAIN WITH BILATERAL SCIATICA: ICD-10-CM

## 2021-08-17 DIAGNOSIS — M25.512 CHRONIC LEFT SHOULDER PAIN: ICD-10-CM

## 2021-08-17 DIAGNOSIS — G89.29 CHRONIC BILATERAL LOW BACK PAIN WITH BILATERAL SCIATICA: ICD-10-CM

## 2021-08-17 DIAGNOSIS — G89.29 CHRONIC LEFT HIP PAIN: Primary | ICD-10-CM

## 2021-08-17 PROCEDURE — 99214 OFFICE O/P EST MOD 30 MIN: CPT | Performed by: FAMILY MEDICINE

## 2021-08-17 RX ORDER — MELOXICAM 15 MG/1
15 TABLET ORAL
Qty: 90 TABLET | Refills: 1 | Status: SHIPPED | OUTPATIENT
Start: 2021-08-17 | End: 2021-08-20 | Stop reason: SDUPTHER

## 2021-08-17 RX ORDER — CYCLOBENZAPRINE HCL 10 MG
10 TABLET ORAL
Qty: 90 TABLET | Refills: 3 | Status: SHIPPED | OUTPATIENT
Start: 2021-08-17 | End: 2021-08-20 | Stop reason: SDUPTHER

## 2021-08-17 NOTE — Clinical Note
Cherri Jeffries is amenable to going to Dr. Rebbeca Nageotte for her chronic LEFT hip pain. .. I tried to put in the referral but since she has [address], the order needs to originate from you (sorry, I tried to help with that!). Also, she is requesting refills of \"all her meds\". I did the ones I prescribe, and told her I would inform you of the request for refills for doxepin & omeprazole. Let me know if you need anything else and \"thank you for allowing me to participate in our mutual patient's care\".

## 2021-08-17 NOTE — PROGRESS NOTES
[facility]  Sports Medicine Office Visit    Todd Galloway is a 39 y.o. female (: 1980) presenting to address:  Chief Complaint   Patient presents with    Hip Pain     left    Shoulder Pain     left    Neck Pain     left       PCP: Pablo Buckner MD  Referring provider: Pablo Buckner MD         Assessment/Plan:         ICD-10-CM ICD-9-CM   1. Chronic left hip pain  M25.552 719.45    G89.29 338.29   2. Femoroacetabular impingement of left hip  M25.852 719.95   3. Chronic bilateral low back pain with bilateral sciatica  M54.42 724.2    M54.41 724.3    G89.29 338.29   4. Chronic left shoulder pain  M25.512 719.41    G89.29 338.29   5. Tendinopathy of left rotator cuff  M67.912 727.9       # chronic LEFT  hip pain - worsened  With  # ARTURO   Reviewed imaging. As she is quite symptomatic, and has been \"putting off\" interventions for years now, is interested in pursuing surgical consultation. > referral to ortho hip scope        # chronic low back pain with B sciatica - LEFT > RIGHT, current ongoing unchanged  Reviewed imaging. Would rather pursue interventions for hip as above. # chronic LEFT shoulder pain - ongoing, overall improved however still has   # LEFT ACj pain - ongoing, worse with lying on left side  Hx of labral repair. Reviewed imaging. Discussed how axial loading on the clavicle (with side lying) can worsen discomfort. Comfortable with avoiding any further intervention for now. Orders Placed This Encounter    EMPL [facility] Ref [facility]     Referral Priority:   Routine     Referral Type:   Consultation     Referral Reason:   Specialty Services Required     Referred to Provider:   Francisco Gonzalez MD     Number of Visits Requested:   1    cyclobenzaprine (FLEXERIL) 10 mg tablet     Sig: Take 1 Tablet by mouth three (3) times daily as needed for Muscle Spasm(s).      Dispense:  90 Tablet     Refill:  3    meloxicam (Mobic) 15 mg tablet Sig: Take 1 Tablet by mouth daily as needed for Pain. Dispense:  90 Tablet     Refill:  1         Management plan & patient instructions discussed with Charissa Fraire, who voiced understanding. Thank you for the opportunity to participate in the care of this patient. If any questions or concerns at all, please feel free to contact me. This document may have been created with the aid of dictation software. Text may contain errors, particularly phonetic errors. Josh Funez MD  Internal Medicine, Family Medicine & Sports Medicine  8/17/2021    On this date 8/17/2021, I have spent 40 minutes providing care to this patient, which included reviewing the EMR to see if there were any recent visits to the ED, specialists, prior lab or radiology results, obtaining the history from the patient, examining the patient, providing discharge education regarding the diagnosis and counseling on appropriate follow-up, as well as documenting this visit in the EMR. Subjective   History:   Charissa Fraire is a 39 y.o. female who presents to address:  Chief Complaint   Patient presents with    Hip Pain     left    Shoulder Pain     left    Neck Pain     left        Last  visit: 5/24/2021    ## LEFT shoulder pain  Hx of LEFT shoulder \"ablation\" done while in the [facility]. Pain started early May 2021 without precipitating incident or trauma but did do some yard work the day before. Since last visit:  Overall shoulder is not as painful. Most discomfort is in the area of the ACj, particularly when lying on her LEFT side to sleep. Did have LEFT shoulder MRI      ## low back pain  Did respond to PO prednisone in March 2021. Since last visit:  Had lumbar MRI. Pain is always on the LEFT side of the low back, \"I think it is because my hip is awful\". ## LEFT hip pain  Hx of right-sided hip scope for ARTURO and labral issues while in the [facility], was told to do the left, but declined.     Since last visit:  Significant anterior hip pain on a daily basis, worse with WB activities. Affects her ability to get up in the morning, and is quite concerned that it is worsening her low back issues. \"I have been putting off doing surgery on this for a long time, and I know things have gotten worse. And I'm not getting any younger, and I'd much rather do an intervention for my hip than my back. Maybe if I get my hip addressed, my back can last longer\". ## polyarthralgia  Was reassured by Dr. Silvio Dowling that her joint pains are not rheumatologic in etiology. However, was identified as needing a hepatologist (which she has established with [facility]), as well as an endocrinologist \"because those thyroid antibodies apparently come with a higher risk of thyroid cancer\". Past Medical History:   Diagnosis Date    Arthritis     Chronic pain     Depression     GERD (gastroesophageal reflux disease)     Hypercholesterolemia     Thromboembolus ([address]     Trauma      Past Surgical History:   Procedure Laterality Date    HX CERVICAL DISKECTOMY  2014    C5/6 in [place], [place]    HX CHOLECYSTECTOMY      HX GYN  2011    cyst removal from left ovary    HX HEENT  2008    septo rhinoplasty    HX ORTHOPAEDIC Right 2010    hip for labral tear and ARTURO    HX ORTHOPAEDIC Right 2011    right hip psoas release and clean up    HX ORTHOPAEDIC Left 2011    shoulder ablation    HX TONSILLECTOMY  2009      reports that she has been smoking cigarettes. She has never used smokeless tobacco. She reports current alcohol use of about 2.0 standard drinks of alcohol per week. She reports that she does not use drugs.   Family History   Problem Relation Age of Onset   Ronfrancisco Rivero COPD Mother    Genevia Josefa Cancer Mother     Elevated Lipids Father     Cancer Sister     Hypertension Maternal Grandmother     Arthritis-rheumatoid Maternal Grandmother     Cancer Maternal Grandfather     Heart Attack Paternal Grandmother     Diabetes Paternal Grandmother     Arthritis-rheumatoid Maternal Aunt 32     No Known Allergies    Problem List:      Patient Active Problem List    Diagnosis    Depression    Chronic neck pain    S/P diskectomy    Chronic back pain    Chronic pain of both knees    Pain in both hands    Arthralgia    Meniere's disease of both ears    Osteoarthritis of spine with radiculopathy, cervical region    H/O cervical spine surgery       Medications:     Current Outpatient Medications on File Prior to Visit   Medication Sig Dispense Refill    cyclobenzaprine (FLEXERIL) 10 mg tablet Take 1 Tab by mouth three (3) times daily as needed for Muscle Spasm(s). 90 Tab 3    meloxicam (Mobic) 15 mg tablet Take 1 Tab by mouth daily as needed for Pain. 90 Tab 1    multivitamin (ONE A DAY) tablet Take 1 Tab by mouth daily.  cholecalciferol (VITAMIN D3) (2,000 UNITS /50 MCG) cap capsule Take 2,000 Units by mouth daily.  omeprazole (PRILOSEC) 40 mg capsule Take 1 Cap by mouth daily. (Patient taking differently: Take 40 mg by mouth daily as needed.) 90 Cap 3    doxepin (SINEquan) 50 mg capsule Take 1 Cap by mouth nightly as needed (for sleep). 90 Cap 1     No current facility-administered medications on file prior to visit. Objective   Physical Assessment:     Vitals:    08/17/21 1109   BP: 114/87   Pulse: 80   Temp: 98.1 °F (36.7 °C)   Weight: 154 lb (69.9 kg)   Height: 5' 5\" (1.651 m)   PainSc:   2       Physical Exam  Nursing note reviewed. Constitutional:       General: She is not in acute distress. Appearance: She is well-developed. She is not diaphoretic. HENT:      Head: Normocephalic and atraumatic. Eyes:      Conjunctiva/sclera: Conjunctivae normal.   Musculoskeletal:      Cervical back: Neck supple. Skin:     General: Skin is warm and dry. Findings: No rash. Neurological:      Mental Status: She is alert and oriented to person, place, and time.    Psychiatric:         Behavior: Behavior normal.         Thought Content: Thought content normal.         Recent Labs & Imaging:         MRI Results (maximum last 3): Results from Abstract encounter on 07/15/21    MRI LUMB SPINE WO CONT    Narrative  EXAM: MRI OF THE LUMBAR SPINE, WITHOUT IV CONTRAST    CLINICAL INDICATION/HISTORY: G89.29: Other chronic pain; progressively worsening lower lumbar back pain over the past year    COMPARISON: None. TECHNIQUE: T1 weighted sagittal and axial, STIR and T2 FSE sagittal, T2 FSE axial.  _______________    FINDINGS:    Lumbar level numbering on this study presumes five lumbar vertebrae in the absence of available plain films. If surgical therapy is considered, plain films should be obtained to confirm localization. Utilized numbering scheme has been labeled on sagittal sequences. OSSEOUS, LUMBAR ALIGNMENT: There is persistent lordotic curvature of the lumbar spine. Small rudimentary disc at S1-S2 No listhesis. Vertebral body and intervertebral disc space heights are maintained. No suspicious osseous lesion. No fracture. Vertebral body marrow signal intensity is normal. There are mild degenerative changes of the left and right sacroiliac joints. PARASPINAL AND RETROPERITONEAL SOFT TISSUES: Visualized soft tissues are unremarkable.  No acute abnormality throughout the visualized retroperitoneum. OTHER: Conus medullaris ends at the L1 vertebral body level. Correlation of axial and sagittal images reveals the following:    T12-L1: No significant disc pathology. No facet arthropathy. No canal or foraminal stenosis. L1-L2: No significant disc pathology. Mild bilateral facet arthropathy and ligamentum flavum buckling. No canal or foraminal stenosis. L2-L3: No significant disc pathology. Mild bilateral facet arthropathy and ligamentum flavum buckling. No canal or foraminal stenosis. L3-L4: No significant disc pathology. Mild bilateral facet arthropathy and ligamentum flavum buckling.  No canal or foraminal stenosis. L4-L5: No significant disc pathology. Mild bilateral facet arthropathy and ligamentum flavum buckling. No canal or foraminal stenosis. L5-S1: Minimal broad-based disc bulge flattens ventral thecal sac. There is partial effacement of the left lateral recess, without definite abutment or impingement of the adjacent transversing left S1 nerve root. Canal and right lateral recess remain otherwise adequate. Mild bilateral facet arthropathy. Mild left and right foraminal stenoses. Impression  1. Minimal degenerative disc disease at L5-S1 with partial effacement of the left lateral recess but no abutment or impingement of the adjacent transversing left S1 nerve root. No high-grade canal stenosis at any lumbar level. 2. Mild multilevel degenerative facet arthrosis. No significant foraminal stenosis or foraminal nerve root impingement. Signed By: Ailyn Hampton MD on 7/16/2021 12:03 PM    ## report obtained via "[employer] ##      MRI SHOULDER LT WO CONT    Narrative  EXAM: MRI OF THE LEFT SHOULDER    CLINICAL INDICATION/HISTORY: M25.512: Pain in left shoulder, G89.29: Other chronic pain; progressively worsening left shoulder pain    COMPARISON: None. TECHNIQUE: T1 weighted sagittal and axial, STIR and T2 FSE sagittal, T2 FSE axial.    _______________    FINDINGS:    ROTATOR CUFF: There is mild supraspinatus and infraspinatus insertional tendinosis. No rotator cuff tear. Subscapularis and teres minor musculotendinous complexes appear within normal limits.  No atrophy or abnormal signal within the muscle bellies. LABRUM: A few surgical anchors are seen within the anterosuperior left glenoid, suggestive of previous labral repair. No residual labral tear.  Moderate articular surface fraying is seen throughout the superior labral base (coronal images 14 and 15), without discrete tear or detachment from the adjacent glenoid, and without biceps tendon involvement.  Remaining glenoid labrum is otherwise within normal limits. BICEPS TENDON COMPLEX: The intra- and extra-articular biceps tendon is unremarkable. CORACOACROMIAL ARCH: There are mild degenerative changes of the acromioclavicular joint, to include marginal proliferative changes, small joint effusion, and mild pericapsular edema. No acromial morphology or undersurface osteophytes to suggest ongoing subacromial impingement. CAPSULE/LIGAMENTS: The superior, middle, and inferior glenohumeral ligaments are unremarkable.  Coracohumeral and coracoacromial ligaments are unremarkable as well.  No MR findings of adhesive capsulitis. BONES/JOINT SPACE: No significant degenerative osteoarthropathy or chondromalacia of the glenohumeral joint.  There is no evidence for fracture, contusion, bony [place], [place] Sachs lesion.  No joint effusion. PERIARTICULAR: No fluid within the subacromial subdeltoid or subcoracoid bursae. Impression  1. Mild supraspinatus and infraspinatus insertional tendinosis. No rotator cuff tear. 2. Focal type I SLAP lesion, typically representing degenerative labral fibrillation, without a discrete tear. Surgical anchors of previous labral repair. 3. Mild degenerative osteoarthropathy of the left acromioclavicular joint, without morphology or secondary findings of subacromial impingement.     Signed By: Brittany Estrella MD on 7/16/2021 12:07 PM    ## report obtained via [employer] ##      MRI HIP LT WO CONT    Narrative  EXAM: MRI OF THE LEFT HIP    CLINICAL INDICATION/HISTORY: Progressively worsening left hip pain over the past year, history of prior contralateral right hip surgery    COMPARISON: None    TECHNIQUE: Large field of view coronal STIR sequence of the pelvis was obtained.  Additional coronal and axial proton density and T2 with fat saturation; sagittal T2 with fat saturation sequences of the hip were also obtained.    _______________    FINDINGS:    HIP JOINT:  Relatively mild degenerative osteoarthropathy of the left hip with asymmetric superior joint space narrowing and superior acetabular subchondral sclerosis. Moderate osseous convexity is seen along the anterosuperior aspect of the left femoral head/neck junction, consistent with CAM-type femoral acetabular impingement morphology. There is mild partial-thickness cartilage loss over the anterosuperior aspect of the left acetabulum. No high-grade partial or focal full-thickness cartilage defect.  No fracture or contusion.  No avascular necrosis. LABRUM:  Minimal curvilinear heterotopic ossification extends into the superior labrum, suggestive of a remote tear and subsequent remodeling. Currently there is no acute labral tear or fibrillation. FLEXORS, ADDUCTORS:  Normal caliber and signal intensity. ABDUCTORS, EXTERNAL ROTATORS:  No focal fluid collection or edema is seen adjacent to the greater trochanter.  Gluteus medius and minimus tendons are normal in caliber and signal intensity. PROXIMAL HAMSTRINGS:  Hamstring origins on the ischial tuberosity are normal in caliber and signal intensity. REMAINING BONY PELVIS, CONTRALATERAL HIP:  Normal marrow signal intensity.  No focal osseous lesion.  Symmetric degenerative changes of the contralateral hip, visualized only on large field-of-view STIR sequence, with no acute abnormality. INTERNAL PELVIC SOFT TISSUES:  Enlarged uterus with multiple large uterine fibroids, biggest measuring 5.5 cm.  No pelvic lymphadenopathy. Impression  1. Relatively mild degenerative osteoarthropathy of the left hip with asymmetric superior joint space narrowing and mild partial-thickness cartilage loss. No acute osseous abnormality. 2. Curvilinear heterotopic ossification within the superior labrum suggestive of a remote tear and subsequent remodeling, likely predisposed by moderate cam-type femoral acetabular impingement morphology. Currently there is no acute labral tear.   3. Enlarged uterus with multiple large uterine fibroids. Signed By: Beverly Alexandre MD on 7/16/2021 12:11 PM      ## report obtained via [employer] ##        Review of Interim Medical Records:       Rheumatology (8/5/2021):  1.  Serologic abnormality: Positive MICHEL blood test with double-stranded DNA that is negative upon repeat testing, MICHEL testing repeated again by hepatology and again negative on 7/28/2021.  Additional rheumatologic work-up is unremarkable, however does have positive thyroglobulin antibodies.  Recommend discussion with PCP. 2.  Paresthesias: Follow-up with primary care and/or neurology  3.  Polyarthralgia: Suspect degenerative changes  4.  Fibromyalgia: Given literature from [facility], recommend follow-up with PCP, our practice does not offer treatment of nonrheumatologic conditions such as fibromyalgia  5.  Generalized osteoarthritis of multiple sites: Continue to work with primary care, physical medicine, orthopedics and pain management    L-spine x-ray (5/27/2021): Mild levoscoliosis.  3 mm retrolisthesis of L4.  Partial lumbarization of S1. Sacroiliac joint x-ray (5/27/2021): Transitional S1 vertebrae.  The SI joints are unremarkable. T-spine x-ray (5/27/2021): No evidence of compression or burst injury.  No evidence of scoliosis    C-spine x-ray (5/27/2021): Mild levoscoliosis.  Artificial disc placement at C5-6.  Patent foramina.

## 2021-08-17 NOTE — PATIENT INSTRUCTIONS
Plan:  - I will send a message to Dr. Diana Bui re:  - refills of your medications  - referral to endocrinology re: your thyroglobulin antibodies      The [facility] - orthopedics office should be calling you to schedule in the next 1-2 weeks.   If you don't hear from them after 2 weeks, call their office directly to check on the status of your referral.     [facility] main phone number: [phone]

## 2021-08-20 DIAGNOSIS — K21.9 GASTROESOPHAGEAL REFLUX DISEASE, UNSPECIFIED WHETHER ESOPHAGITIS PRESENT: ICD-10-CM

## 2021-08-20 DIAGNOSIS — F51.01 PRIMARY INSOMNIA: ICD-10-CM

## 2021-08-20 RX ORDER — OMEPRAZOLE 40 MG/1
40 CAPSULE, DELAYED RELEASE ORAL DAILY
Qty: 90 CAPSULE | Refills: 3 | Status: CANCELLED | OUTPATIENT
Start: 2021-08-20

## 2021-08-23 DIAGNOSIS — R76.8 THYROGLOBULIN ANTIBODY POSITIVE: Primary | ICD-10-CM

## 2021-08-23 RX ORDER — MELOXICAM 15 MG/1
15 TABLET ORAL
Qty: 90 TABLET | Refills: 1 | Status: SHIPPED | OUTPATIENT
Start: 2021-08-23

## 2021-08-23 RX ORDER — DOXEPIN HYDROCHLORIDE 50 MG/1
50 CAPSULE ORAL
Qty: 90 CAPSULE | Refills: 0 | Status: SHIPPED | OUTPATIENT
Start: 2021-08-23 | End: 2021-11-29 | Stop reason: SDUPTHER

## 2021-08-23 RX ORDER — CYCLOBENZAPRINE HCL 10 MG
10 TABLET ORAL
Qty: 90 TABLET | Refills: 3 | Status: SHIPPED | OUTPATIENT
Start: 2021-08-23 | End: 2022-02-16 | Stop reason: SDUPTHER

## 2021-08-23 NOTE — TELEPHONE ENCOUNTER
I called pt. She has not been able to switch her pcm through  to the [facility] office. She was unaware she had a good omeprazole rx still but is completely out of doxepin. She does need that refill right away. She said at her last visit with Dr Maicol Dudley she was told that she would mention to her pcp that a referral was needed (she has ) to [facility] in [place] because her rheumatology labs came back as abnormal thyroid antibodies. Please consider referral and refill.

## 2021-08-24 NOTE — TELEPHONE ENCOUNTER
I left pt a message yesterday to contact Aleksandr to change pcm back to Dr Tyler Moses. We cannot process the rheum ref without that.

## 2021-09-02 ENCOUNTER — OFFICE VISIT (OUTPATIENT)
Dept: HEMATOLOGY | Age: 41
End: 2021-09-02
Payer: OTHER GOVERNMENT

## 2021-09-02 VITALS
TEMPERATURE: 96.9 F | OXYGEN SATURATION: 98 % | SYSTOLIC BLOOD PRESSURE: 110 MMHG | HEART RATE: 106 BPM | BODY MASS INDEX: 24.99 KG/M2 | RESPIRATION RATE: 25 BRPM | WEIGHT: 150 LBS | HEIGHT: 65 IN | DIASTOLIC BLOOD PRESSURE: 86 MMHG

## 2021-09-02 DIAGNOSIS — K76.0 FATTY LIVER: Primary | ICD-10-CM

## 2021-09-02 PROCEDURE — 99213 OFFICE O/P EST LOW 20 MIN: CPT | Performed by: NURSE PRACTITIONER

## 2021-09-02 PROCEDURE — 91200 LIVER ELASTOGRAPHY: CPT | Performed by: NURSE PRACTITIONER

## 2021-09-02 NOTE — PROGRESS NOTES
Jyothi Coello MD, Mayte Sorensen, Poppy Mcmillan MD, MPH      DONTRELL Curran, ACNP-BC     April S Pushpa, [facility]   Cynthia Garzon FNP-C    Duy Sanchez, [facility]       Freida George [facility] 136    at [address], [address] [place] [address], [facility] 22.    [phone]    FAX: [address] [place] [address], [address]    [phone]    FAX: [phone]       Patient Care Team:  Dorothy Barrera MD as PCP - [facility] Provider  Mini Don MD (Neurology)  Sendy Potts MD (Sports Medicine)  Shakila Bearden DO (Rheumatology)      Problem List  Date Reviewed: 8/17/2021        Codes Class Noted    Depression ICD-10-CM: F32.9  ICD-9-CM: 311  Unknown        Chronic neck pain ICD-10-CM: M54.2, G89.29  ICD-9-CM: 723.1, 338.29  1/26/2021        S/P diskectomy ICD-10-CM: H82.410  ICD-9-CM: V45.89  1/21/2020        Chronic back pain ICD-10-CM: M54.9, G89.29  ICD-9-CM: 724.5, 338.29  1/21/2020        Chronic pain of both knees ICD-10-CM: M25.561, M25.562, G89.29  ICD-9-CM: 719.46, 338.29  5/2/2019        Pain in both hands ICD-10-CM: M79.641, M79.642  ICD-9-CM: 729.5  5/2/2019        Arthralgia ICD-10-CM: M25.50  ICD-9-CM: 719.40  5/2/2019        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  The active problem list, all pertinent past medical history, medications, liver histology, radiologic findings, and laboratory findings related to the liver disorder were reviewed and discussed with the patient. The patient is a 39 y.o. female who is suspected to have fatty liver disease based upon ultrasound. Serologic evaluation for markers of chronic liver disease was positive for ASMA. The most recent imaging of the liver was Ultrasound performed in 2/2020. Results suggest fatty liver disease. No liver mass lesions noted. Assessment of liver fibrosis with Fibroscan was performed in the office today. The result was 6.8 kPa which correlates with   stage 1 portal fibrosis. The CAP score of 286 suggests hepatic steatosis. The patient has the following symptoms which could be attributed to the liver disorder:  pain in the right side over the liver. The patient is not experiencing the following symptoms which are commonly seen in this liver disorder: fatigue. The patient completes all daily activities without any functional limitations. ASSESSMENT AND PLAN:  Fatty liver  Suspect the patient has fatty liver based upon imaging, serologic studies that are negative for other causes of chronic liver disease,     A liver biopsy has not been performed. NAFL is a benign form of fatty liver disease and not thought to progress to fibrosis or cirrhosis. AST is normal. ALT is elevated. ALP is normal. Liver function is normal.  The platelet count is normal.      Based upon laboratory studies and imaging the patient does not appear to have significant liver injury. Have performed laboratory testing to monitor liver function and degree of liver injury. This included BMP, hepatic panel, CBC with platelet count. Serologic testing for causes of chronic liver disease was ordered. Positive ASMA. Only a liver biopsy can confirm if the patient does have fatty liver and differentiate NAFL from FERNÁNDEZ. The treatment is the same; weight reduction.   If the liver biopsy demonstrates FERNÁNDEZ the patient could be eligible for enrollment into clinical trials for treatment of FERNÁNDEZ. There is no reason to perform liver biopsy at this time. Liver chemistries will be monitored. If the liver enzymes remain persistently elevated over the next 1-2 years a liver biopsy should be performed to ensure there is no ongoing chronic liver disease. The patient has a normal or near normal BMI and can not possibly lose sufficient weight to resolve NAFLD. There is currently no FDA approved medical treatment for fatty liver, NALFD or FERNÁNDEZ. The only medical treatments for FERNÁNDEZ are through clinical trials. The patient would be a good candidate for enrollment into a clinical trial if found to have FERNÁNDEZ. Since a liver biopsy was not performed it is possible the patient may not have fatty liver or this may not be contributing to the elevation in liver enzymes. Positive serology for autoimmune liver disease  The positive ASMA suggests that there the may be an underlying autoimmune liver disease. Given that the liver enzymes are near normal it is unlikely there is an autoimmune liver disorder. Will continue to monitor to see if the liver enzymes remain normal, fluctuate or become persistently elevated. The patient does not appear to have an autoimmune liver disease. Will continue to monitor liver enzymes. Counseling for diet and weight loss in patients with confirmed or suspected NAFLD  The patient was counseled regarding diet and exercise to achieve weight loss. The best diet for patients with fatty liver is one very low in carbohydrates and enriched with protein such as an Aiyana's program.      The patient was told not to consume any food products and drinks containing fructose as this enhances hepatic fat synthesis. There is no medication or vitamin supplements that we advocate for FERNÁNDEZ.     Using glitazones in patients without diabetes mellitus has been shown to reduce fat content in the liver but has no effect on fibrosis and is associated with weight gain. Vitamin E has also been used but the data is not very good and most experts no longer advocate this. Screening for Hepatocellular Carcinoma  HCC screening is not necessary if the patient has no evidence of cirrhosis. Treatment of other medical problems in patients with chronic liver disease  There are no contraindications for the patient to take most medications that are necessary for treatment of other medical issues. The patient can take any medications utilized for treatment of DM, statins to treat hypercholesterolemia    Normal doses of acetaminophen, as recommended on the label of the bottle, are not hepatotoxic except in the setting of daily alcohol use, even in patients with cirrhosis and can be utilized for pain. Counseling for alcohol in patients with chronic liver disease  The patient was counseled regarding alcohol consumption and the effect of alcohol on chronic liver disease. The patient does not consume any significant amount of alcohol. Vaccinations   Vaccination for viral hepatitis A and B is not needed. The patient has serologic evidence of prior exposure or vaccination with immunity. Routine vaccinations against other bacterial and viral agents can be performed as indicated. Annual flu vaccination should be administered if indicated. ALLERGIES  No Known Allergies    MEDICATIONS  Current Outpatient Medications   Medication Sig    doxepin (SINEquan) 50 mg capsule Take 1 Capsule by mouth nightly as needed (for sleep).  cyclobenzaprine (FLEXERIL) 10 mg tablet Take 1 Tablet by mouth three (3) times daily as needed for Muscle Spasm(s).  meloxicam (Mobic) 15 mg tablet Take 1 Tablet by mouth daily as needed for Pain.  multivitamin (ONE A DAY) tablet Take 1 Tab by mouth daily.     cholecalciferol (VITAMIN D3) (2,000 UNITS /50 MCG) cap capsule Take 2,000 Units by mouth daily.  omeprazole (PRILOSEC) 40 mg capsule Take 1 Cap by mouth daily. (Patient taking differently: Take 40 mg by mouth daily as needed.)     No current facility-administered medications for this visit. SYSTEM REVIEW NOT RELATED TO LIVER DISEASE OR REVIEWED ABOVE:  Constitution systems: Negative for fever, chills, weight gain, weight loss. Eyes: Negative for visual changes. ENT: Negative for sore throat, painful swallowing. Respiratory: Negative for cough, hemoptysis, SOB. Cardiology: Negative for chest pain, palpitations. GI:  Negative for constipation or diarrhea. : Negative for urinary frequency, dysuria, hematuria, nocturia. Skin: Negative for rash. Hematology: Negative for easy bruising, blood clots. Musculo-skelatal: Negative for back pain, muscle pain, weakness. Neurologic: Negative for headaches, dizziness, vertigo, memory problems not related to HE. Psychology: Negative for anxiety, depression. FAMILY HISTORY:  The father Has/had the following following chronic disease(s):High cholesterol, HTN. The mother Has/had the following chronic disease(s): COPD. There is no family history of liver disease. The following family members have immune disorders: Maternal Aunt has RA, cousin has Hashimoto's    SOCIAL HISTORY:  The patient is . The patient has 2 children,    The patient currently smokes 5 cigarettes weekly   The patient has never consumed significant amounts of alcohol. The patient does not work outside the home. PHYSICAL EXAMINATION:  Visit Vitals  /86 (BP 1 Location: Left upper arm, BP Patient Position: Sitting, BP Cuff Size: Small adult)   Pulse (!) 106   Temp 96.9 °F (36.1 °C)   Resp 25   Ht 5' 5\" (1.651 m)   Wt 150 lb (68 kg)   SpO2 98%   BMI 24.96 kg/m²       General: No acute distress. Eyes: Sclera anicteric. ENT: No oral lesions. Thyroid normal.  Nodes: No adenopathy. Skin: No spider angiomata. No jaundice.   No palmar erythema. Respiratory: Lungs clear to auscultation. Cardiovascular: Regular heart rate. No murmurs. No JVD. Abdomen: Soft non-tender, liver size normal to percussion/palpation. Spleen not palpable. No obvious ascites. Extremities: No edema. No muscle wasting. No gross arthritic changes. Neurologic: Alert and oriented. Cranial nerves grossly intact. No asterixis. LABORATORY STUDIES:  Liver [place] of [address] Units 7/28/2021 5/24/2021   WBC 4.6 - 13.2 K/uL 8.5    ANC 1.8 - 8.0 K/UL 5.4    HGB 12.0 - 16.0 g/dL 12.9     - 420 K/uL 308    AST 10 - 38 U/L 36 22   ALT 13 - 56 U/L 81 (H) 65 (H)   Alk Phos 45 - 117 U/L 101 108   Bili, Total 0.2 - 1.0 MG/DL 0.3 0.2   Bili, Direct 0.0 - 0.2 MG/DL <0.1 0.09   Albumin 3.4 - 5.0 g/dL 4.1 4.6   BUN 7.0 - 18 MG/DL 8    Creat 0.6 - 1.3 MG/DL 0.61    Na 136 - 145 mmol/L 138    K 3.5 - 5.5 mmol/L 3.9    Cl 100 - 111 mmol/L 105    CO2 21 - 32 mmol/L 27    Glucose 74 - 99 mg/dL 106 (H)        SEROLOGIES:  Serologies Latest Ref Rng & Units 7/28/2021   Hep A Ab, Total Negative   Positive (A)   Hep B Surface Ag <1.00 Index <0.10   Hep B Surface Ag Interp NEG   Negative   Hep B Core Ab, Total Negative   Negative   Hep B Surface Ab >10.0 mIU/mL 441.28   Hep B Surface Ab Interp POS   Positive   Hep C Ab 0.0 - 0.9 s/co ratio <0.1   Ferritin 8 - 388 NG/ML 47   Iron % Saturation 20 - 50 % 20   MICHEL, IFA  Negative   C-ANCA Neg:<1:20 titer <1:20   P-ANCA Neg:<1:20 titer <1:20   ANCA Neg:<1:20 titer <1:20   ASMCA 0 - 19 Units 50 (H)       LIVER HISTOLOGY:  9/2021. FibroScan performed at [address]. EkPa was 6.8. IQR/med 28%. . The results suggested a fibrosis level of F1. The CAP score suggests there is hepatic steatosis. ENDOSCOPIC PROCEDURES:  Not available or performed    RADIOLOGY:  2/2020. Ultrasound of liver. Echogenic consistent with fatty liver. No liver mass lesions. No dilated bile ducts. No ascites.     OTHER TESTING:  Not available or performed    FOLLOW-UP:  All of the issues listed above in the Assessment and Plan were discussed with the patient. All questions were answered. The patient expressed a clear understanding of the above. [facility] 87 in 6 months for routine monitoring.        JAZMYNE Burns-BC  [address] [place] [address] [address]  [address]  [phone]

## 2021-09-27 ENCOUNTER — TELEPHONE (OUTPATIENT)
Dept: FAMILY MEDICINE CLINIC | Age: 41
End: 2021-09-27

## 2021-09-27 NOTE — TELEPHONE ENCOUNTER
Pt needs a referral for the Orthopedic Surgeon. Pt stated that her PCM was changed back to Dr Hernadez so the referral she had was cancelled and it needs to come from Dr Hernadez. Dr Mejia from [place] is whom the referral was originally sent to. Pt would like to have done as soon as possible. Before Oct 27 th. Please advise

## 2021-09-27 NOTE — TELEPHONE ENCOUNTER
Pt needs a referral for the Orthopedic Surgeon. Pt stated that her PCM was changed back to Dr Lula Vieyra so the referral she had was cancelled and it needs to come from Dr Lula Vieyra. Dr Laury Bosch from [facility] is whom the referral was originally sent to. Pt would like to have done as soon as possible. Before Oct 27 th.  Please advise

## 2021-09-28 DIAGNOSIS — M25.552 CHRONIC LEFT HIP PAIN: Primary | ICD-10-CM

## 2021-09-28 DIAGNOSIS — G89.29 CHRONIC LEFT HIP PAIN: Primary | ICD-10-CM

## 2021-09-29 NOTE — TELEPHONE ENCOUNTER
I tried to do the new referral. PCM change is not reflected on [facility] website. I called pt. She said they told her on the phone it was switched back to Dr Hernadez but it may take 10 days to reflect this. I tried to continue under out tax id but it immediately kicks it out as needs additional information since the PCM is incorrect. Pt asked if I can call [facility] to see if they can update the PCM change. I told her she would need to contact her customer service as I cannot initiate any pcm changes on her behalf. Referral open.

## 2021-09-29 NOTE — TELEPHONE ENCOUNTER
I tried to do the new referral. PCM change is not reflected on [facility] website. I called pt. She said they told her on the phone it was switched back to Dr Aj Mahoney but it may take 10 days to reflect this. I tried to continue under out tax id but it immediately kicks it out as needs additional information since the [facility] is incorrect. Pt asked if I can call [facility] to see if they can update the PCM change. I told her she would need to contact her customer service as I cannot initiate any pcm changes on her behalf. Referral open.

## 2021-11-15 ENCOUNTER — OFFICE VISIT (OUTPATIENT)
Dept: ORTHOPEDIC SURGERY | Age: 41
End: 2021-11-15
Payer: OTHER GOVERNMENT

## 2021-11-15 VITALS — BODY MASS INDEX: 24.3 KG/M2 | WEIGHT: 146 LBS | HEART RATE: 104 BPM | OXYGEN SATURATION: 98 % | TEMPERATURE: 98.2 F

## 2021-11-15 DIAGNOSIS — M25.552 LEFT HIP PAIN: ICD-10-CM

## 2021-11-15 DIAGNOSIS — S73.192A TEAR OF LEFT ACETABULAR LABRUM, INITIAL ENCOUNTER: Primary | ICD-10-CM

## 2021-11-15 PROCEDURE — 99024 POSTOP FOLLOW-UP VISIT: CPT | Performed by: ORTHOPAEDIC SURGERY

## 2021-11-15 PROCEDURE — 73502 X-RAY EXAM HIP UNI 2-3 VIEWS: CPT | Performed by: ORTHOPAEDIC SURGERY

## 2021-11-15 NOTE — PROGRESS NOTES
Kishore Martínez  1980   Chief Complaint   Patient presents with    Hip Pain     left        HISTORY OF PRESENT ILLNESS  Kishore Martínez is a 39 y.o. female who presents today for evaluation of left hip. Referred by Dr. Diana Solorzano and previously got a MRI done. She rates her pain 4/10 today. Pain has been present for about 10 years. She was in the [employer] and was medically discharged due to hip problems. She has had 2 surgeries on her right hip. Pain with internal rotation. [place]. Patient describes the pain as aching, throbbing and popping and cracking that is Constant in nature. Symptoms are worse with Exercise and is better with  Rest. Associated symptoms include nothing. Since problem started, it: is unchanged. Pain does not wake patient up at night. Has taken no meds for the problem. Has tried following treatments: Injections:NO; Brace:NO; Therapy:NO; Cane/Crutch:NO       No Known Allergies     Past Medical History:   Diagnosis Date    Arthritis     Chronic pain     Depression     GERD (gastroesophageal reflux disease)     Hypercholesterolemia     Thromboembolus ([facility] [address]     Trauma       Social History     Socioeconomic History    Marital status:      Spouse name: Not on file    Number of children: Not on file    Years of education: Not on file    Highest education level: Not on file   Occupational History    Not on file   Tobacco Use    Smoking status: Light Tobacco Smoker     Types: Cigarettes    Smokeless tobacco: Never Used   Substance and Sexual Activity    Alcohol use: Yes     Alcohol/week: 2.0 standard drinks     Types: 2 Glasses of wine per week    Drug use: Never    Sexual activity: Yes     Partners: Male   Other Topics Concern    Not on file   Social History Narrative    2/26/2020: Former , medically discharged in Oct 2012 with 100% service connection. Still  to [employer].   Was living in [facility] [place], then [place] x 3.5 years, then in [place] since Nov 2018. Social Determinants of Health     Financial Resource Strain:     Difficulty of Paying Living Expenses: Not on file   Food Insecurity:     Worried About Running Out of Food in the Last Year: Not on file    Lynda of Food in the Last Year: Not on file   Transportation Needs:     Lack of Transportation (Medical): Not on file    Lack of Transportation (Non-Medical):  Not on file   Physical Activity:     Days of Exercise per Week: Not on file    Minutes of Exercise per Session: Not on file   Stress:     Feeling of Stress : Not on file   Social Connections:     Frequency of Communication with Friends and Family: Not on file    Frequency of Social Gatherings with Friends and Family: Not on file    Attends Mandaen Services: Not on file    Active Member of [address] [place] [address] [employer] or Organizations: Not on file    Attends Club or Organization Meetings: Not on file    Marital Status: Not on file   Intimate Partner Violence:     Fear of Current or Ex-Partner: Not on file    Emotionally Abused: Not on file    Physically Abused: Not on file    Sexually Abused: Not on file   Housing Stability:     Unable to Pay for Housing in the Last Year: Not on file    Number of [place] in the Last Year: Not on file    Unstable Housing in the Last Year: Not on file      Past Surgical History:   Procedure Laterality Date    HX CERVICAL DISKECTOMY  2014    C5/6 in [address] HX GYN  2011    cyst removal from left ovary    HX HEENT  2008    septo rhinoplasty    HX ORTHOPAEDIC Right 2010    hip for labral tear and ARTURO    HX ORTHOPAEDIC Right 2011    right hip psoas release and clean up    HX ORTHOPAEDIC Left 2011    shoulder ablation    HX TONSILLECTOMY  2009      Family History   Problem Relation Age of Onset    COPD Mother     Cancer Mother     Elevated Lipids Father     Cancer Sister     Hypertension Maternal Grandmother     Arthritis-rheumatoid Maternal Grandmother     Cancer Maternal Grandfather     Heart Attack Paternal Grandmother     Diabetes Paternal Grandmother     Arthritis-rheumatoid Maternal Aunt 32      Current Outpatient Medications   Medication Sig    doxepin (SINEquan) 50 mg capsule Take 1 Capsule by mouth nightly as needed (for sleep).  cyclobenzaprine (FLEXERIL) 10 mg tablet Take 1 Tablet by mouth three (3) times daily as needed for Muscle Spasm(s).  meloxicam (Mobic) 15 mg tablet Take 1 Tablet by mouth daily as needed for Pain.  multivitamin (ONE A DAY) tablet Take 1 Tab by mouth daily.  cholecalciferol (VITAMIN D3) (2,000 UNITS /50 MCG) cap capsule Take 2,000 Units by mouth daily.  omeprazole (PRILOSEC) 40 mg capsule Take 1 Cap by mouth daily. (Patient taking differently: Take 40 mg by mouth daily as needed.)     No current facility-administered medications for this visit. REVIEW OF SYSTEM   Patient denies: Weight loss, Fever/Chills, HA, Visual changes, Fatigue, Chest pain, SOB, Abdominal pain, N/V/D/C, Blood in stool or urine, Edema. Pertinent positive as above in HPI. All others were negative    PHYSICAL EXAM:   Visit Vitals  Pulse (!) 104   Temp 98.2 °F (36.8 °C) (Temporal)   Wt 146 lb (66.2 kg)   SpO2 98%   BMI 24.30 kg/m²     The patient is a well-developed, well-nourished female   in no acute distress. The patient is alert and oriented times three. The patient is alert and oriented times three. Mood and affect are normal.  LYMPHATIC: lymph nodes are not enlarged and are within normal limits  SKIN: normal in color and non tender to palpation. There are no bruises or abrasions noted. NEUROLOGICAL: Motor sensory exam is within normal limits. Reflexes are equal bilaterally.  There is normal sensation to pinprick and light touch  MUSCULOSKELETAL:  Examination Left hip   Skin Intact   Flexion ROM 80   Extension ROM 0   External Rotation ROM 20   Internal Rotation ROM 20   Abduction ROM 20 Adduction ROM 20   FADDIR -   JAROD -   Log roll test -   Trochanteric tenderness -   Shawna test -   Neurovascular Intact         IMAGING: XR of the right hip with 3 views obtained in the office dated 11/15/2021 was reviewed and read by Dr. Elsa Parikh: slight cam lesion left hip       MRI of the left hip dated 5/28/2021 was reviewed and read by Dr. Elsa Parikh:   1. Relatively mild degenerative osteoarthropathy of the left hip with asymmetric superior joint space narrowing and mild partial-thickness cartilage loss. No acute osseous abnormality. 2. Curvilinear heterotopic ossification within the superior labrum suggestive of a remote tear and subsequent remodeling, likely predisposed by moderate cam-type femoral acetabular impingement morphology. Currently there is no acute labral tear. 3. Enlarged uterus with multiple large uterine fibroids. ement. IMPRESSION:      ICD-10-CM ICD-9-CM    1. Tear of left acetabular labrum, initial encounter  S73.192A 843.8    2. Left hip pain  M25.552 719.45 AMB POC X-RAY RADEX HIP UNI WITH PELVIS 2-3 VIEWS        PLAN:  1. Patient presents today with left hip pain. Despite her previous MRI, I would like to order a MRI arthrogram to get a better view and r/o a labral tear. Return following MRI   Risk factors include: n/a  2. No ultrasound exam indicated today  3. No cortisone injection indicated today   4. No Physical/Occupational Therapy indicated today  5. Yes diagnostic test indicated today: L HIP MRI ARTH   6. No durable medical equipment indicated today  7. No referral indicated today   8. No medications indicated today:   9. No Narcotic indicated today    RTC Following MRI     Office note will be sent to referring provider. Scribed by Raymon Evangelista ([address]) as dictated by Manuelito Cool MD    I, Dr. Manuelito Cool, confirm that all documentation is accurate.     Manuelito Cool M.D.   [facility]

## 2021-11-29 DIAGNOSIS — K21.9 GASTROESOPHAGEAL REFLUX DISEASE, UNSPECIFIED WHETHER ESOPHAGITIS PRESENT: ICD-10-CM

## 2021-11-29 DIAGNOSIS — F51.01 PRIMARY INSOMNIA: ICD-10-CM

## 2021-11-29 RX ORDER — DOXEPIN HYDROCHLORIDE 50 MG/1
50 CAPSULE ORAL
Qty: 90 CAPSULE | Refills: 0 | Status: SHIPPED | OUTPATIENT
Start: 2021-11-29 | End: 2022-03-08 | Stop reason: SDUPTHER

## 2021-11-29 RX ORDER — OMEPRAZOLE 40 MG/1
40 CAPSULE, DELAYED RELEASE ORAL DAILY
Qty: 90 CAPSULE | Refills: 0 | Status: SHIPPED | OUTPATIENT
Start: 2021-11-29 | End: 2022-03-14 | Stop reason: SDUPTHER

## 2021-11-29 NOTE — TELEPHONE ENCOUNTER
Patient called in regards to getting her medications refilled and sent over . Requested Prescriptions     Pending Prescriptions Disp Refills    doxepin (SINEquan) 50 mg capsule 90 Capsule 0     Sig: Take 1 Capsule by mouth nightly as needed (for sleep).  omeprazole (PRILOSEC) 40 mg capsule 90 Capsule 3     Sig: Take 1 Capsule by mouth daily.

## 2022-02-16 RX ORDER — CYCLOBENZAPRINE HCL 10 MG
10 TABLET ORAL
Qty: 90 TABLET | Refills: 3 | Status: SHIPPED | OUTPATIENT
Start: 2022-02-16

## 2022-03-01 ENCOUNTER — HOSPITAL ENCOUNTER (OUTPATIENT)
Dept: LAB | Age: 42
Discharge: HOME | End: 2022-03-01
Payer: OTHER GOVERNMENT

## 2022-03-01 ENCOUNTER — OFFICE VISIT (OUTPATIENT)
Dept: HEMATOLOGY | Age: 42
End: 2022-03-01
Payer: OTHER GOVERNMENT

## 2022-03-01 VITALS
SYSTOLIC BLOOD PRESSURE: 120 MMHG | DIASTOLIC BLOOD PRESSURE: 87 MMHG | HEART RATE: 94 BPM | WEIGHT: 157 LBS | OXYGEN SATURATION: 98 % | TEMPERATURE: 97.5 F | HEIGHT: 64 IN | BODY MASS INDEX: 26.8 KG/M2 | RESPIRATION RATE: 23 BRPM

## 2022-03-01 DIAGNOSIS — K76.0 FATTY LIVER: Primary | ICD-10-CM

## 2022-03-01 DIAGNOSIS — K76.0 FATTY LIVER: ICD-10-CM

## 2022-03-01 PROBLEM — E06.3 AUTOIMMUNE THYROIDITIS: Status: ACTIVE | Noted: 2022-03-01

## 2022-03-01 PROBLEM — Z90.711 S/P PARTIAL HYSTERECTOMY: Status: ACTIVE | Noted: 2022-03-01

## 2022-03-01 LAB
ALBUMIN SERPL-MCNC: 3.7 G/DL (ref 3.4–5)
ALBUMIN/GLOB SERPL: 1 {RATIO} (ref 0.8–1.7)
ALP SERPL-CCNC: 105 U/L (ref 45–117)
ALT SERPL-CCNC: 99 U/L (ref 13–56)
ANION GAP SERPL CALC-SCNC: 7 MMOL/L (ref 3–18)
AST SERPL-CCNC: 40 U/L (ref 10–38)
BASOPHILS # BLD: 0 K/UL (ref 0–0.1)
BASOPHILS NFR BLD: 1 % (ref 0–2)
BILIRUB DIRECT SERPL-MCNC: <0.1 MG/DL (ref 0–0.2)
BILIRUB SERPL-MCNC: 0.2 MG/DL (ref 0.2–1)
BUN SERPL-MCNC: 8 MG/DL (ref 7–18)
BUN/CREAT SERPL: 11 (ref 12–20)
CALCIUM SERPL-MCNC: 9.1 MG/DL (ref 8.5–10.1)
CHLORIDE SERPL-SCNC: 104 MMOL/L (ref 100–111)
CO2 SERPL-SCNC: 26 MMOL/L (ref 21–32)
CREAT SERPL-MCNC: 0.7 MG/DL (ref 0.6–1.3)
DIFFERENTIAL METHOD BLD: ABNORMAL
EOSINOPHIL # BLD: 0.3 K/UL (ref 0–0.4)
EOSINOPHIL NFR BLD: 4 % (ref 0–5)
ERYTHROCYTE [DISTWIDTH] IN BLOOD BY AUTOMATED COUNT: 13 % (ref 11.6–14.5)
GLOBULIN SER CALC-MCNC: 3.7 G/DL (ref 2–4)
GLUCOSE SERPL-MCNC: 101 MG/DL (ref 74–99)
HCT VFR BLD AUTO: 35.9 % (ref 35–45)
HGB BLD-MCNC: 12.1 G/DL (ref 12–16)
IMM GRANULOCYTES # BLD AUTO: 0 K/UL (ref 0–0.04)
IMM GRANULOCYTES NFR BLD AUTO: 0 % (ref 0–0.5)
LYMPHOCYTES # BLD: 2.1 K/UL (ref 0.9–3.6)
LYMPHOCYTES NFR BLD: 29 % (ref 21–52)
MCH RBC QN AUTO: 31.3 PG (ref 24–34)
MCHC RBC AUTO-ENTMCNC: 33.7 G/DL (ref 31–37)
MCV RBC AUTO: 93 FL (ref 78–100)
MONOCYTES # BLD: 0.5 K/UL (ref 0.05–1.2)
MONOCYTES NFR BLD: 7 % (ref 3–10)
NEUTS SEG # BLD: 4.2 K/UL (ref 1.8–8)
NEUTS SEG NFR BLD: 59 % (ref 40–73)
NRBC # BLD: 0 K/UL (ref 0–0.01)
NRBC BLD-RTO: 0 PER 100 WBC
PLATELET # BLD AUTO: 291 K/UL (ref 135–420)
PMV BLD AUTO: 9.6 FL (ref 9.2–11.8)
POTASSIUM SERPL-SCNC: 4.2 MMOL/L (ref 3.5–5.5)
PROT SERPL-MCNC: 7.4 G/DL (ref 6.4–8.2)
RBC # BLD AUTO: 3.86 M/UL (ref 4.2–5.3)
SODIUM SERPL-SCNC: 137 MMOL/L (ref 136–145)
WBC # BLD AUTO: 7.1 K/UL (ref 4.6–13.2)

## 2022-03-01 PROCEDURE — 80048 BASIC METABOLIC PNL TOTAL CA: CPT

## 2022-03-01 PROCEDURE — 80076 HEPATIC FUNCTION PANEL: CPT

## 2022-03-01 PROCEDURE — 85025 COMPLETE CBC W/AUTO DIFF WBC: CPT

## 2022-03-01 PROCEDURE — 99213 OFFICE O/P EST LOW 20 MIN: CPT | Performed by: NURSE PRACTITIONER

## 2022-03-01 PROCEDURE — 36415 COLL VENOUS BLD VENIPUNCTURE: CPT

## 2022-03-01 NOTE — PROGRESS NOTES
Priyanka Hodge MD, [place], [facility], [place]      DONTRELL Wheeler, ACNP-BC     Stacy Barrow, AGPCNP-BC   Maria Guadalupe Simmons FNP-FADY Peralta, [facility]       Freida Deputado [address]    at [address], [address] [place] [address], [facility] 22.    [phone]    FAX: [address] [place] [address], [address]    [phone]    FAX: [phone]     Patient Care Team:  Hannah Albarado MD as PCP - [facility] Provider  Arabella Chew MD (Neurology)  Arun Eng MD (Sports Medicine)  Nimesh Erickson DO (Rheumatology)      Problem List  Date Reviewed: 3/1/2022          Codes Class Noted    S/P partial hysterectomy ICD-10-CM: Z90.711  ICD-9-CM: V88.02  3/1/2022        Autoimmune thyroiditis ICD-10-CM: E06.3  ICD-9-CM: 245.2  3/1/2022        Depression ICD-10-CM: F32. 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 The active problem list, all pertinent past medical history, medications, liver histology, radiologic findings, and laboratory findings related to the liver disorder were reviewed and discussed with the patient. The patient is a 43 y.o. female who is suspected to have fatty liver disease based upon ultrasound. Serologic evaluation for markers of chronic liver disease was positive for ASMA. The most recent imaging of the liver was Ultrasound performed in 2/2020. Results suggest fatty liver disease. No liver mass lesions noted. Assessment of liver fibrosis with Fibroscan was performed in the office today. The result was 6.8 kPa which correlates with stage 1 portal fibrosis. The CAP score of 286 suggests hepatic steatosis. The patient has the following symptoms which could be attributed to the liver disorder:  pain in the right side over the liver. The patient is not experiencing the following symptoms which are commonly seen in this liver disorder: fatigue. The patient completes all daily activities without any functional limitations. ASSESSMENT AND PLAN:  Fatty liver  Suspect the patient has fatty liver based upon imaging, serologic studies that are negative for other causes of chronic liver disease,     A liver biopsy has not been performed. NAFL is a benign form of fatty liver disease and not thought to progress to fibrosis or cirrhosis. Liver transaminases are elevated. ALP is normal. Liver function is normal.  The platelet count is normal.      Based upon laboratory studies and imaging the patient does not appear to have significant liver injury. Have performed laboratory testing to monitor liver function and degree of liver injury. This included BMP, hepatic panel, CBC with platelet count. Serologic testing for causes of chronic liver disease was ordered. Positive ASMA.      Only a liver biopsy can confirm if the patient does have fatty liver and differentiate NAFL from FERNÁNDEZ. The treatment is the same; weight reduction. If the liver biopsy demonstrates FERNÁNDEZ the patient could be eligible for enrollment into clinical trials for treatment of FERNÁNDEZ. There is no reason to perform liver biopsy at this time. Liver chemistries will be monitored. If the liver enzymes remain persistently elevated over the next 1-2 years a liver biopsy should be performed to ensure there is no ongoing chronic liver disease. The patient has a normal or near normal BMI and can not possibly lose sufficient weight to resolve NAFLD. There is currently no FDA approved medical treatment for fatty liver, NALFD or FERNÁNDEZ. The only medical treatments for FERNÁNDEZ are through clinical trials. The patient would be a good candidate for enrollment into a clinical trial if found to have FERNÁNDEZ. Since a liver biopsy was not performed it is possible the patient may not have fatty liver or this may not be contributing to the elevation in liver enzymes. Positive serology for autoimmune liver disease  The positive ASMA suggests that there the may be an underlying autoimmune liver disease. Given that the liver enzymes are near normal it is unlikely there is an autoimmune liver disorder. Will continue to monitor to see if the liver enzymes remain normal, fluctuate or become persistently elevated. The patient does not appear to have an autoimmune liver disease. Will continue to monitor liver enzymes. Counseling for diet and weight loss in patients with confirmed or suspected NAFLD  The patient was counseled regarding diet and exercise to achieve weight loss. The best diet for patients with fatty liver is one very low in carbohydrates and enriched with protein such as an Aiyana's program.      The patient was told not to consume any food products and drinks containing fructose as this enhances hepatic fat synthesis.     There is no medication or vitamin supplements that we advocate for FERNÁNDEZ. Using glitazones in patients without diabetes mellitus has been shown to reduce fat content in the liver but has no effect on fibrosis and is associated with weight gain. Vitamin E has also been used but the data is not very good and most experts no longer advocate this. Screening for Hepatocellular Carcinoma  HCC screening is not necessary if the patient has no evidence of cirrhosis. Treatment of other medical problems in patients with chronic liver disease  There are no contraindications for the patient to take most medications that are necessary for treatment of other medical issues. The patient can take any medications utilized for treatment of DM, statins to treat hypercholesterolemia    Normal doses of acetaminophen, as recommended on the label of the bottle, are not hepatotoxic except in the setting of daily alcohol use, even in patients with cirrhosis and can be utilized for pain. Counseling for alcohol in patients with chronic liver disease  The patient was counseled regarding alcohol consumption and the effect of alcohol on chronic liver disease. The patient does not consume any significant amount of alcohol. Vaccinations   Vaccination for viral hepatitis A and B is not needed. The patient has serologic evidence of prior exposure or vaccination with immunity. Routine vaccinations against other bacterial and viral agents can be performed as indicated. Annual flu vaccination should be administered if indicated. ALLERGIES  No Known Allergies    MEDICATIONS  Current Outpatient Medications   Medication Sig    cyclobenzaprine (FLEXERIL) 10 mg tablet Take 1 Tablet by mouth three (3) times daily as needed for Muscle Spasm(s).  doxepin (SINEquan) 50 mg capsule Take 1 Capsule by mouth nightly as needed (for sleep).  omeprazole (PRILOSEC) 40 mg capsule Take 1 Capsule by mouth daily.     meloxicam (Mobic) 15 mg tablet Take 1 Tablet by mouth daily as needed for Pain.  multivitamin (ONE A DAY) tablet Take 1 Tab by mouth daily.  cholecalciferol (VITAMIN D3) (2,000 UNITS /50 MCG) cap capsule Take 2,000 Units by mouth daily. No current facility-administered medications for this visit. SYSTEM REVIEW NOT RELATED TO LIVER DISEASE OR REVIEWED ABOVE:  Constitution systems: Negative for fever, chills, weight gain, weight loss. Eyes: Negative for visual changes. ENT: Negative for sore throat, painful swallowing. Respiratory: Negative for cough, hemoptysis, SOB. Cardiology: Negative for chest pain, palpitations. GI:  Negative for constipation or diarrhea. : Negative for urinary frequency, dysuria, hematuria, nocturia. Skin: Negative for rash. Hematology: Negative for easy bruising, blood clots. Musculo-skelatal: Negative for back pain, muscle pain, weakness. Neurologic: Negative for headaches, dizziness, vertigo, memory problems not related to HE. Psychology: Negative for anxiety, depression. FAMILY HISTORY:  The father Has/had the following following chronic disease(s):High cholesterol, HTN. The mother Has/had the following chronic disease(s): COPD. There is no family history of liver disease. The following family members have immune disorders: Maternal Aunt has RA, cousin has Hashimoto's    SOCIAL HISTORY:  The patient is . The patient has 2 children,    The patient currently smokes 5 cigarettes weekly   The patient has never consumed significant amounts of alcohol. The patient does not work outside the home. PHYSICAL EXAMINATION:  Visit Vitals  /87 (BP 1 Location: Left upper arm, BP Patient Position: Sitting, BP Cuff Size: Small adult)   Pulse 94   Temp 97.5 °F (36.4 °C)   Resp 23   Ht 5' 4\" (1.626 m)   Wt 157 lb (71.2 kg)   SpO2 98%   BMI 26.95 kg/m²       General: No acute distress. Eyes: Sclera anicteric. ENT: No oral lesions.   Thyroid normal.  Nodes: No adenopathy. Skin: No spider angiomata. No jaundice. No palmar erythema. Respiratory: Lungs clear to auscultation. Cardiovascular: Regular heart rate. No murmurs. No JVD. Abdomen: Soft non-tender, liver size normal to percussion/palpation. Spleen not palpable. No obvious ascites. Extremities: No edema. No muscle wasting. No gross arthritic changes. Neurologic: Alert and oriented. Cranial nerves grossly intact. No asterixis. LABORATORY STUDIES:  Liver [place] of [address] Units 3/1/2022 7/28/2021   WBC 4.6 - 13.2 K/uL 7.1 8.5   ANC 1.8 - 8.0 K/UL 4.2 5.4   HGB 12.0 - 16.0 g/dL 12.1 12.9    - 420 K/uL 291 308   AST 10 - 38 U/L 40 (H) 36   ALT 13 - 56 U/L 99 (H) 81 (H)   Alk Phos 45 - 117 U/L 105 101   Bili, Total 0.2 - 1.0 MG/DL 0.2 0.3   Bili, Direct 0.0 - 0.2 MG/DL <0.1 <0.1   Albumin 3.4 - 5.0 g/dL 3.7 4.1   BUN 7.0 - 18 MG/DL 8 8   Creat 0.6 - 1.3 MG/DL 0.70 0.61   Na 136 - 145 mmol/L 137 138   K 3.5 - 5.5 mmol/L 4.2 3.9   Cl 100 - 111 mmol/L 104 105   CO2 21 - 32 mmol/L 26 27   Glucose 74 - 99 mg/dL 101 (H) 106 (H)       SEROLOGIES:  Serologies Latest Ref Rng & Units 7/28/2021   Hep A Ab, Total Negative   Positive (A)   Hep B Surface Ag <1.00 Index <0.10   Hep B Surface Ag Interp NEG   Negative   Hep B Core Ab, Total Negative   Negative   Hep B Surface Ab >10.0 mIU/mL 441.28   Hep B Surface Ab Interp POS   Positive   Hep C Ab 0.0 - 0.9 s/co ratio <0.1   Ferritin 8 - 388 NG/ML 47   Iron % Saturation 20 - 50 % 20   MICHEL, IFA  Negative   C-ANCA Neg:<1:20 titer <1:20   P-ANCA Neg:<1:20 titer <1:20   ANCA Neg:<1:20 titer <1:20   ASMCA 0 - 19 Units 50 (H)       LIVER HISTOLOGY:  9/2021. FibroScan performed at [facility] [place]. EkPa was 6.8. IQR/med 28%. . The results suggested a fibrosis level of F1. The CAP score suggests there is hepatic steatosis. ENDOSCOPIC PROCEDURES:  Not available or performed    RADIOLOGY:  2/2020. Ultrasound of liver.  Echogenic consistent with fatty liver. No liver mass lesions. No dilated bile ducts. No ascites. OTHER TESTING:  Not available or performed    FOLLOW-UP:  All of the issues listed above in the Assessment and Plan were discussed with the patient. All questions were answered. The patient expressed a clear understanding of the above. [address] [address] in 6 months for routine monitoring. Fibroscan at next visit.       JAZMYNE Galindo-BC  [facility] [place] [facility] [place] [address]  [facility], [address] [place] [address] Street - [address]  [phone]

## 2022-03-07 DIAGNOSIS — F51.01 PRIMARY INSOMNIA: ICD-10-CM

## 2022-03-07 RX ORDER — DOXEPIN HYDROCHLORIDE 50 MG/1
50 CAPSULE ORAL
Qty: 90 CAPSULE | Refills: 0 | Status: CANCELLED | OUTPATIENT
Start: 2022-03-07

## 2022-03-07 NOTE — TELEPHONE ENCOUNTER
Pt is requesting a refill for medication and requesting a referral for a dermatologist in the [place] location.  Pt does have future appt last OV 1/26/21    Future Appointments   Date Time Provider Jp Fountainisti   3/16/2022  2:00 PM MD ARELY Sanders BS AMB   9/1/2022 10:10 AM TAMMY Figueroa BS AMB

## 2022-03-07 NOTE — TELEPHONE ENCOUNTER
Last refill was 11/29/2021 qty of 90 with 0 refills  Future Appointments   Date Time Provider Jp Jalloh   3/8/2022  1:00 PM Bashir Hernadez MD [facility] BS AMB   9/1/2022 10:10 AM TAMMY Gilliam BS AMB

## 2022-03-07 NOTE — TELEPHONE ENCOUNTER
Pt is requesting a refill for medication and requesting a referral for a dermatologist in the [place] location. Pt does have future appt last OV 1/26/21    Future Appointments   Date Time Provider Department Center   3/16/2022  2:00 PM Abraham Hernadez MD [facility] BS AMB   9/1/2022 10:10 AM Ashleigh Singer NP [facility] BS AMB

## 2022-03-07 NOTE — TELEPHONE ENCOUNTER
Pt has been r/s for earlier appt    Future Appointments   Date Time Provider Jp Yeimi   3/8/2022  1:00 PM Bib Hernadez MD [facility] BS AMB   9/1/2022 10:10 AM TAMMY Combs BS AMB

## 2022-03-07 NOTE — TELEPHONE ENCOUNTER
Pt has been r/s for earlier appt    Future Appointments   Date Time Provider Department Center   3/8/2022  1:00 PM Abraham Hernadez MD [facility] BS AMB   9/1/2022 10:10 AM Ashleigh Singer NP [facility] BS AMB

## 2022-03-07 NOTE — TELEPHONE ENCOUNTER
Last refill was 11/29/2021 qty of 90 with 0 refills  Future Appointments   Date Time Provider Department Center   3/8/2022  1:00 PM Abraham Hernadez MD [facility] BS AMB   9/1/2022 10:10 AM Ashleigh Singer NP [facility] BS AMB

## 2022-03-08 ENCOUNTER — OFFICE VISIT (OUTPATIENT)
Dept: FAMILY MEDICINE CLINIC | Age: 42
End: 2022-03-08
Payer: OTHER GOVERNMENT

## 2022-03-08 VITALS
WEIGHT: 158 LBS | DIASTOLIC BLOOD PRESSURE: 84 MMHG | HEIGHT: 64 IN | HEART RATE: 91 BPM | TEMPERATURE: 97.7 F | BODY MASS INDEX: 26.98 KG/M2 | SYSTOLIC BLOOD PRESSURE: 117 MMHG | RESPIRATION RATE: 16 BRPM | OXYGEN SATURATION: 98 %

## 2022-03-08 DIAGNOSIS — D22.9 NUMEROUS SKIN MOLES: ICD-10-CM

## 2022-03-08 DIAGNOSIS — F32.A DEPRESSION, UNSPECIFIED DEPRESSION TYPE: ICD-10-CM

## 2022-03-08 DIAGNOSIS — F51.01 PRIMARY INSOMNIA: Primary | ICD-10-CM

## 2022-03-08 PROCEDURE — 99214 OFFICE O/P EST MOD 30 MIN: CPT | Performed by: INTERNAL MEDICINE

## 2022-03-08 RX ORDER — DULOXETIN HYDROCHLORIDE 30 MG/1
30 CAPSULE, DELAYED RELEASE ORAL DAILY
Qty: 90 CAPSULE | Refills: 0 | Status: SHIPPED | OUTPATIENT
Start: 2022-03-08

## 2022-03-08 RX ORDER — DOXEPIN HYDROCHLORIDE 50 MG/1
50 CAPSULE ORAL
Qty: 90 CAPSULE | Refills: 1 | Status: SHIPPED | OUTPATIENT
Start: 2022-03-08

## 2022-03-08 NOTE — PROGRESS NOTES
Edin Ledesma is a 43 y.o. female (: 1980) presenting to address:    Chief Complaint   Patient presents with    Medication Evaluation       Vitals:    22 1258   BP: 117/84   Pulse: 91   Resp: 16   Temp: 97.7 °F (36.5 °C)   TempSrc: Temporal   SpO2: 98%   Weight: 158 lb (71.7 kg)   Height: 5' 4\" (1.626 m)   PainSc:   3   PainLoc: Back       Hearing/Vision:   No exam data present    Learning Assessment:     Learning Assessment 2019   PRIMARY LEARNER Patient   HIGHEST LEVEL OF EDUCATION - PRIMARY LEARNER  2 YEARS OF COLLEGE   BARRIERS PRIMARY LEARNER NONE   PRIMARY LANGUAGE ENGLISH    NEED No   LEARNER PREFERENCE PRIMARY DEMONSTRATION   ANSWERED BY patient   RELATIONSHIP SELF     Depression Screening:     3 most recent PHQ Screens 3/8/2022   PHQ Not Done -   Little interest or pleasure in doing things More than half the days   Feeling down, depressed, irritable, or hopeless More than half the days   Total Score PHQ 2 4   Trouble falling or staying asleep, or sleeping too much -   Feeling tired or having little energy -   Poor appetite, weight loss, or overeating -   Feeling bad about yourself - or that you are a failure or have let yourself or your family down -   Trouble concentrating on things such as school, work, reading, or watching TV -   Moving or speaking so slowly that other people could have noticed; or the opposite being so fidgety that others notice -   Thoughts of being better off dead, or hurting yourself in some way -   PHQ 9 Score -   How difficult have these problems made it for you to do your work, take care of your home and get along with others -     Fall Risk Assessment:     Fall Risk Assessment, last 12 mths 3/8/2022   Able to walk? Yes   Fall in past 12 months? 0   Do you feel unsteady? 0   Are you worried about falling 0     Abuse Screening:     Abuse Screening Questionnaire 3/8/2022   Do you ever feel afraid of your partner?  N   Are you in a relationship with someone who physically or mentally threatens you? N   Is it safe for you to go home? Y     ADL Assessment:   No flowsheet data found. Coordination of Care Questionaire:   1. \"Have you been to the ER, urgent care clinic since your last visit? Hospitalized since your last visit? \" No    2. \"Have you seen or consulted any other health care providers outside of the [address] [place] [address] since your last visit? \" Yes Where: Endo, Liver, GYN, and  Oncology     3. For patients aged 39-70: Has the patient had a colonoscopy? Yes - no Care Gap present     If the patient is female:    4. For patients aged 41-77: Has the patient had a mammogram within the past 2 years? Yes - no Care Gap present    5. For patients aged 21-65: Has the patient had a pap smear? Yes - no Care Gap present    Advanced Directive:   1. Do you have an Advanced Directive? NO    2. Would you like information on Advanced Directives?  NO

## 2022-03-08 NOTE — PATIENT INSTRUCTIONS
Depression and Chronic Disease: Care Instructions  Your Care Instructions     A chronic disease is one that you have for a long time. Some chronic diseases can be controlled, but they usually cannot be cured. Depression is common in people with chronic diseases, but it often goes unnoticed. Many people have concerns about seeking treatment for a mental health problem. You may think it's a sign of weakness, or you don't want people to know about it. It's important to overcome these reasons for not seeking treatment. Treating depression or anxiety is good for your health. Follow-up care is a key part of your treatment and safety. Be sure to make and go to all appointments, and call your doctor if you are having problems. It's also a good idea to know your test results and keep a list of the medicines you take. How can you care for yourself at home? Watch for symptoms of depression  The symptoms of depression are often subtle at first. You may think they are caused by your disease rather than depression. Or you may think it is normal to be depressed when you have a chronic disease. If you are depressed you may:  · Feel sad or hopeless. · Feel guilty or worthless. · Not enjoy the things you used to enjoy. · Feel hopeless, as though life is not worth living. · Have trouble thinking or remembering. · Have low energy, and you may not eat or sleep well. · Pull away from others. · Think often about death or killing yourself. (Keep the numbers for these national suicide hotlines: 9-268-508-TALK [1-226.995.6556] and 6-881-IJOTKZP [1-141.278.7113]. )  Get treatment  By treating your depression, you can feel more hopeful and have more energy. If you feel better, you may take better care of yourself, so your health may improve. · Talk to your doctor if you have any changes in mood during treatment for your disease. · Ask your doctor for help.  Counseling, antidepressant medicine, or a combination of the two can help most people with depression. Often a combination works best. Counseling can also help you cope with having a chronic disease. When should you call for help? Call 911 anytime you think you may need emergency care. For example, call if:    · You feel like hurting yourself or someone else.     · Someone you know has depression and is about to attempt or is attempting suicide. Call your doctor now or seek immediate medical care if:    · You hear voices.     · Someone you know has depression and:  ? Starts to give away his or her possessions. ? Uses illegal drugs or drinks alcohol heavily. ? Talks or writes about death, including writing suicide notes or talking about guns, knives, or pills. ? Starts to spend a lot of time alone. ? Acts very aggressively or suddenly appears calm. Watch closely for changes in your health, and be sure to contact your doctor if:    · You do not get better as expected. Where can you learn more? Go to http://www.gray.com/  Enter A548 in the search box to learn more about \"Depression and Chronic Disease: Care Instructions. \"  Current as of: June 16, 2021               Content Version: 13.2  © 0580-5242 [email or web address]. Care instructions adapted under license by [employer] (which disclaims liability or warranty for this information). If you have questions about a medical condition or this instruction, always ask your healthcare professional. [place] [address] any warranty or liability for your use of this information. Recovering From Depression: Care Instructions  Your Care Instructions     Taking good care of yourself is important as you recover from depression. In time, your symptoms will fade as your treatment takes hold. Do not give up. Instead, focus your energy on getting better. Your mood will improve. It just takes some time.  Focus on things that can help you feel better, such as being with friends and family, eating well, and getting enough rest. But take things slowly. Do not do too much too soon. You will begin to feel better gradually. Follow-up care is a key part of your treatment and safety. Be sure to make and go to all appointments, and call your doctor if you are having problems. It's also a good idea to know your test results and keep a list of the medicines you take. How can you care for yourself at home? Be realistic  · If you have a large task to do, break it up into smaller steps you can handle, and just do what you can. · You may want to put off important decisions until your depression has lifted. If you have plans that will have a major impact on your life, such as marriage, divorce, or a job change, try to wait a bit. Talk it over with friends and loved ones who can help you look at the overall picture first.  · Reaching out to people for help is important. Do not isolate yourself. Let your family and friends help you. Find someone you can trust and confide in, and talk to that person. · Be patient, and be kind to yourself. Remember that depression is not your fault and is not something you can overcome with willpower alone. Treatment is important for depression, just like for any other illness. Feeling better takes time, and your mood will improve little by little. Stay active  · Stay busy and get outside. Take a walk, or try some other light exercise. · Talk with your doctor about an exercise program. Exercise can help with mild depression. · Go to a movie or concert. Take part in a Hindu activity or other social gathering. Go to a ball game. · Ask a friend to have dinner with you. Take care of yourself  · Eat a balanced diet with plenty of fresh fruits and vegetables, whole grains, and lean protein. If you have lost your appetite, eat small snacks rather than large meals. · Avoid using illegal drugs or marijuana and drinking alcohol.  Do not take medicines that have not been prescribed for you. They may interfere with medicines you may be taking for depression, or they may make your depression worse. · Take your medicines exactly as they are prescribed. You may start to feel better within 1 to 3 weeks of taking antidepressant medicine. But it can take as many as 6 to 8 weeks to see more improvement. If you have questions or concerns about your medicines, or if you do not notice any improvement by 3 weeks, talk to your doctor. · Continue to take your medicine after your symptoms improve. Taking your medicine for at least 6 months after you feel better can help keep you from getting depressed again. If this isn't the first time you have been depressed, your doctor may recommend you to take medicine even longer. · If you have any side effects from your medicine, tell your doctor. Many side effects are mild and will go away on their own after you have been taking the medicine for a few weeks. Some may last longer. Talk to your doctor if side effects are bothering you too much. You might be able to try a different medicine. · Continue counseling. It may help prevent depression from returning, especially if you've had multiple episodes of depression. Talk with your counselor if you are having a hard time attending your sessions or you think the sessions aren't working. Don't just stop going. · Get enough sleep. Talk to your doctor if you are having problems sleeping. · Avoid sleeping pills unless they are prescribed by the doctor treating your depression. Sleeping pills may make you groggy during the day, and they may interact with other medicine you are taking. · If you have any other illnesses, such as diabetes, heart disease, or high blood pressure, make sure to continue with your treatment. Tell your doctor about all of the medicines you take, including those with or without a prescription.   · If you or someone you know talks about suicide, self-harm, or feeling hopeless, get help right away. Call the [address] [place] [address] at 0-649-586-SQEB (0-805.183.9676) or text HOME to 490194 to access the Crisis Text Line. Consider saving these numbers in your phone. When should you call for help? Call 911 anytime you think you may need emergency care. For example, call if:    · You feel like hurting yourself or someone else.     · Someone you know has depression and is about to attempt or is attempting suicide. Call your doctor now or seek immediate medical care if:    · You hear voices.     · Someone you know has depression and:  ? Starts to give away his or her possessions. ? Uses illegal drugs or drinks alcohol heavily. ? Talks or writes about death, including writing suicide notes or talking about guns, knives, or pills. ? Starts to spend a lot of time alone. ? Acts very aggressively or suddenly appears calm. Watch closely for changes in your health, and be sure to contact your doctor if:    · You do not get better as expected. Where can you learn more? Go to http://www.gray.com/  Enter N529 in the search box to learn more about \"Recovering From Depression: Care Instructions. \"  Current as of: June 16, 2021               Content Version: 13.2  © 1206-2322 [employer]. Care instructions adapted under license by [employer] (which disclaims liability or warranty for this information). If you have questions about a medical condition or this instruction, always ask your healthcare professional. [place] [address] any warranty or liability for your use of this information.

## 2022-03-09 NOTE — PROGRESS NOTES
HISTORY OF PRESENT ILLNESS  Armando Trevizo is a 43 y.o. female. HPI  Insomnia, controlled on Doxepin nightly  Depression, not controlled, she is feeling depressed and has no motivation/interest in doing things at times  Has multiples moles, wants referral to Dermatology  Followed by GI for fatty liver, had recent labs done on 3-1-2022, reviewed today  Followed by Sports med/orthopedics for joints pain  Review of Systems   Respiratory: Negative for shortness of breath. Cardiovascular: Negative for chest pain. Psychiatric/Behavioral: The patient is not nervous/anxious. Physical Exam  Vitals reviewed. Cardiovascular:      Rate and Rhythm: Normal rate and regular rhythm. Heart sounds: Normal heart sounds. Pulmonary:      Effort: Pulmonary effort is normal.      Breath sounds: Normal breath sounds. Skin:     Comments: Has multiple moles on face/back/feet. Neurological:      Mental Status: She is oriented to person, place, and time. Psychiatric:         Attention and Perception: Attention normal.         Mood and Affect: Mood is depressed. Mood is not anxious. Speech: Speech normal.         Behavior: Behavior normal.         Thought Content: Thought content normal.         ASSESSMENT and PLAN  Diagnoses and all orders for this visit:    1. Primary insomnia, stable  -     doxepin (SINEquan) 50 mg capsule; Take 1 Capsule by mouth nightly as needed (for sleep). 2. Depression, unspecified depression type, uncontrolled, will try  -     DULoxetine (CYMBALTA) 30 mg capsule; Take 1 Capsule by mouth daily. 3. Numerous skin moles  -     REFERRAL TO DERMATOLOGY      Follow-up and Dispositions    · Return in about 1 month (around 4/8/2022). Lab Results   Component Value Date/Time    ALT (SGPT) 99 (H) 03/01/2022 11:27 AM    AST (SGOT) 40 (H) 03/01/2022 11:27 AM    Alk.  phosphatase 105 03/01/2022 11:27 AM    Bilirubin, direct <0.1 03/01/2022 11:27 AM    Bilirubin, total 0.2 03/01/2022 11:27 AM     Lab Results   Component Value Date/Time    Sodium 137 03/01/2022 11:27 AM    Potassium 4.2 03/01/2022 11:27 AM    Chloride 104 03/01/2022 11:27 AM    CO2 26 03/01/2022 11:27 AM    Anion gap 7 03/01/2022 11:27 AM    Glucose 101 (H) 03/01/2022 11:27 AM    BUN 8 03/01/2022 11:27 AM    Creatinine 0.70 03/01/2022 11:27 AM    BUN/Creatinine ratio 11 (L) 03/01/2022 11:27 AM    GFR est AA >60 03/01/2022 11:27 AM    GFR est non-AA >60 03/01/2022 11:27 AM    Calcium 9.1 03/01/2022 11:27 AM     Lab Results   Component Value Date/Time    WBC 7.1 03/01/2022 11:27 AM    HGB 12.1 03/01/2022 11:27 AM    HCT 35.9 03/01/2022 11:27 AM    PLATELET 853 98/22/6544 11:27 AM    MCV 93.0 03/01/2022 11:27 AM

## 2022-03-14 DIAGNOSIS — K21.9 GASTROESOPHAGEAL REFLUX DISEASE, UNSPECIFIED WHETHER ESOPHAGITIS PRESENT: ICD-10-CM

## 2022-03-14 RX ORDER — OMEPRAZOLE 40 MG/1
40 CAPSULE, DELAYED RELEASE ORAL DAILY
Qty: 90 CAPSULE | Refills: 2 | Status: SHIPPED | OUTPATIENT
Start: 2022-03-14

## 2022-03-14 NOTE — TELEPHONE ENCOUNTER
Last seen 3/8/22    Last filled 11/29/21 qty 90 w/ 0 refills     Future Appointments   Date Time Provider [facility] [place]   4/8/2022  1:00 PM MD ARELY Bueno BS AMB   9/1/2022 10:10 AM TAMMY Ac AMB

## 2022-03-18 PROBLEM — M47.22 OSTEOARTHRITIS OF SPINE WITH RADICULOPATHY, CERVICAL REGION: Status: ACTIVE | Noted: 2019-05-02

## 2022-03-18 PROBLEM — Z90.711 S/P PARTIAL HYSTERECTOMY: Status: ACTIVE | Noted: 2022-03-01

## 2022-03-19 PROBLEM — G89.29 CHRONIC NECK PAIN: Status: ACTIVE | Noted: 2021-01-26

## 2022-03-19 PROBLEM — M54.9 CHRONIC BACK PAIN: Status: ACTIVE | Noted: 2020-01-21

## 2022-03-19 PROBLEM — Z98.890 H/O CERVICAL SPINE SURGERY: Status: ACTIVE | Noted: 2019-05-02

## 2022-03-19 PROBLEM — G89.29 CHRONIC BACK PAIN: Status: ACTIVE | Noted: 2020-01-21

## 2022-03-19 PROBLEM — G89.29 CHRONIC PAIN OF BOTH KNEES: Status: ACTIVE | Noted: 2019-05-02

## 2022-03-19 PROBLEM — M54.2 CHRONIC NECK PAIN: Status: ACTIVE | Noted: 2021-01-26

## 2022-03-19 PROBLEM — M79.641 PAIN IN BOTH HANDS: Status: ACTIVE | Noted: 2019-05-02

## 2022-03-19 PROBLEM — Z98.890 S/P DISKECTOMY: Status: ACTIVE | Noted: 2020-01-21

## 2022-03-19 PROBLEM — M79.642 PAIN IN BOTH HANDS: Status: ACTIVE | Noted: 2019-05-02

## 2022-03-19 PROBLEM — M25.562 CHRONIC PAIN OF BOTH KNEES: Status: ACTIVE | Noted: 2019-05-02

## 2022-03-19 PROBLEM — M25.561 CHRONIC PAIN OF BOTH KNEES: Status: ACTIVE | Noted: 2019-05-02

## 2022-03-19 PROBLEM — M25.50 ARTHRALGIA: Status: ACTIVE | Noted: 2019-05-02

## 2022-03-20 PROBLEM — E06.3 AUTOIMMUNE THYROIDITIS: Status: ACTIVE | Noted: 2022-03-01

## 2022-03-20 PROBLEM — H81.03 MENIERE'S DISEASE OF BOTH EARS: Status: ACTIVE | Noted: 2019-05-02

## 2022-03-21 ENCOUNTER — TRANSCRIBE ORDER (OUTPATIENT)
Dept: SCHEDULING | Age: 42
End: 2022-03-21

## 2022-03-21 DIAGNOSIS — Z12.31 ENCOUNTER FOR SCREENING MAMMOGRAM FOR BREAST CANCER: Primary | ICD-10-CM

## 2022-03-24 ENCOUNTER — TELEPHONE (OUTPATIENT)
Dept: FAMILY MEDICINE CLINIC | Age: 42
End: 2022-03-24

## 2022-03-24 NOTE — TELEPHONE ENCOUNTER
FYI..the patient called in regards to requesting a new referral. Pt stated that she went to her OBGYN at [facility] and they referred her to Dr Keyshawn Estrella to have a scar revision procedure but [facility] informed the pt that the referral has to come from her PCM. Pt contacted the office requesting the referral and was informed that it depends on her last visit in the office and she has to get the OBGYN to send over office notes with diagnosis codes being thast Dr Hernadez was not the provider to refer her. Pt verbally understood. Pt is trying to get the referral and surgery before her [facility] ends around April 23 rd or 28 th. Pt was given fax number to Dr Hernadez office. Pt stated that she goes in for a consultation on April 5 th. Phone number for the surgeons office is [phone] [address] [place] [id number]

## 2022-03-24 NOTE — TELEPHONE ENCOUNTER
FYI..the patient called in regards to requesting a new referral. Pt stated that she went to her OBGYN at [facility] and they referred her to Dr Rose Bean to have a scar revision procedure but [facility] informed the pt that the referral has to come from her PCM. Pt contacted the office requesting the referral and was informed that it depends on her last visit in the office and she has to get the OBGYN to send over office notes with diagnosis codes being thast Dr Denis Galeas was not the provider to refer her. Pt verbally understood. Pt is trying to get the referral and surgery before her [facility] ends around April 23 rd or 29 th. Pt was given fax number to Dr Denis Galeas office. Pt stated that she goes in for a consultation on April 5 th.  Phone number for the surgeons office is [phone] [place] [phone]

## 2022-03-28 NOTE — TELEPHONE ENCOUNTER
I responded to patients message in [employer] that the OBGYN needs to call us or send us information to use to create the referral through .

## 2022-03-28 NOTE — TELEPHONE ENCOUNTER
I responded to patients message in [employer] that the OBGYN needs to call us or send us information to use to create the referral through [address] [place] [address].

## 2022-04-07 ENCOUNTER — HOSPITAL ENCOUNTER (OUTPATIENT)
Dept: MAMMOGRAPHY | Age: 42
Discharge: HOME | End: 2022-04-07
Payer: OTHER GOVERNMENT

## 2022-04-07 DIAGNOSIS — Z12.31 ENCOUNTER FOR SCREENING MAMMOGRAM FOR BREAST CANCER: ICD-10-CM

## 2022-04-07 PROCEDURE — 77067 SCR MAMMO BI INCL CAD: CPT

## 2022-04-08 ENCOUNTER — TELEPHONE (OUTPATIENT)
Dept: FAMILY MEDICINE CLINIC | Age: 42
End: 2022-04-08

## 2022-04-08 DIAGNOSIS — R92.8 ABNORMAL MAMMOGRAM OF LEFT BREAST: Primary | ICD-10-CM

## 2022-04-08 NOTE — TELEPHONE ENCOUNTER
----- Message from Keldeal sent at 4/7/2022  4:32 PM EDT -----  Subject: Appointment Request    Reason for Call: Routine Existing Condition Follow Up    QUESTIONS  Type of Appointment? Established Patient  Reason for appointment request? No appointments available during search  Additional Information for Provider? pt is calling in stating her appt   card says 4/18/2022 so pt can not make appt for 4/8/2022 and would like a   call back with an appt or virtual visit.   ---------------------------------------------------------------------------  --------------  Esanex  What is the best way for the office to contact you? OK to leave message on   voicemail  Preferred Call Back Phone Number? [phone]  ---------------------------------------------------------------------------  --------------  SCRIPT ANSWERS  Relationship to Patient? Self  Is this follow up request related to routine Diabetes Management? No  Have you been diagnosed with, awaiting test results for, or told that you   are suspected of having COVID-19 (Coronavirus)? (If patient has tested   negative or was tested as a requirement for work, school, or travel and   not based on symptoms, answer no)? No  Within the past 10 days have you developed any of the following symptoms   (answer no if symptoms have been present longer than 10 days or began   more than 10 days ago)? Fever or Chills, Cough, Shortness of breath or   difficulty breathing, Loss of taste or smell, Sore throat, Nasal   congestion, Sneezing or runny nose, Fatigue or generalized body aches   (answer no if pain is specific to a body part e.g. back pain), Diarrhea,   Headache? No  Have you had close contact with someone with COVID-19 in the last 7 days? No  (Service Expert  click yes below to proceed with [employer] As Usual   Scheduling)?  Yes

## 2022-04-09 NOTE — PROGRESS NOTES
Asymmetry of  left mammogram, need Dx left side mammogram and possible US, tests ordered, [facility]

## 2022-04-20 ENCOUNTER — HOSPITAL ENCOUNTER (OUTPATIENT)
Dept: MAMMOGRAPHY | Age: 42
Discharge: HOME | End: 2022-04-20
Payer: OTHER GOVERNMENT

## 2022-04-20 DIAGNOSIS — R92.8 ABNORMAL MAMMOGRAM OF LEFT BREAST: ICD-10-CM

## 2022-04-20 PROCEDURE — 77061 BREAST TOMOSYNTHESIS UNI: CPT

## 2023-10-17 NOTE — TELEPHONE ENCOUNTER
[id number] Mila Vasquez, Thank you Rx Refill Note  Requested Prescriptions     Pending Prescriptions Disp Refills    irbesartan (AVAPRO) 150 MG tablet [Pharmacy Med Name: IRBESARTAN 150MG TABLETS] 90 tablet 1     Sig: TAKE 1 TABLET BY MOUTH DAILY      Last office visit with prescribing clinician: 9/15/2022   Last telemedicine visit with prescribing clinician: Visit date not found   Next office visit with prescribing clinician: Visit date not found                         Would you like a call back once the refill request has been completed: [] Yes [] No    If the office needs to give you a call back, can they leave a voicemail: [] Yes [] No    Odin Levy MA  10/17/23, 08:38 EDT

## 2024-01-25 NOTE — TELEPHONE ENCOUNTER
"  Subjective:      Patient ID:  Anjana Zaragoza is a 40 y.o. female who presents for   Chief Complaint   Patient presents with    Rash     Over a month     Hx 1/9:  Rash: Started having a ring like rash on her thigh a few months back. She tried topical antifungals intermittently but not consistently. She is here today as the rash has now spread to her lower leg and also on her abdomen. Reports mild itching, no pain, no fevers. She does have a dog but does not recall having the dog near the area of the rash.  · griseofulvin (BLAISE-PEG) 250 MG tablet  · triamcinolone acetonide 0.1% (KENALOG) 0.1 % cream"    She just completed the 2 weeks of griseofulvin thinks may be a little better.        Rash - Initial  Affected locations: left upper leg and abdomen  Signs / symptoms: itching and irritated      Review of Systems   Constitutional:  Negative for fever, chills, weight loss, weight gain, fatigue, night sweats and malaise.   Skin:  Positive for itching and rash. Negative for wears hat.   Hematologic/Lymphatic: Does not bruise/bleed easily.       Objective:   Physical Exam   Constitutional: She appears well-developed and well-nourished.   Neurological: She is alert and oriented to person, place, and time.   Psychiatric: She has a normal mood and affect.   Skin:   Areas Examined (abnormalities noted in diagram):   Abdomen Inspection Performed  RLE Inspected  LLE Inspection Performed            Diagram Legend     Erythematous scaling macule/papule c/w actinic keratosis       Vascular papule c/w angioma      Pigmented verrucoid papule/plaque c/w seborrheic keratosis      Yellow umbilicated papule c/w sebaceous hyperplasia      Irregularly shaped tan macule c/w lentigo     1-2 mm smooth white papules consistent with Milia      Movable subcutaneous cyst with punctum c/w epidermal inclusion cyst      Subcutaneous movable cyst c/w pilar cyst      Firm pink to brown papule c/w dermatofibroma      Pedunculated fleshy " Called and left message for patient to return the call for her US results and placed a referral. papule(s) c/w skin tag(s)      Evenly pigmented macule c/w junctional nevus     Mildly variegated pigmented, slightly irregular-bordered macule c/w mildly atypical nevus      Flesh colored to evenly pigmented papule c/w intradermal nevus       Pink pearly papule/plaque c/w basal cell carcinoma      Erythematous hyperkeratotic cursted plaque c/w SCC      Surgical scar with no sign of skin cancer recurrence      Open and closed comedones      Inflammatory papules and pustules      Verrucoid papule consistent consistent with wart     Erythematous eczematous patches and plaques     Dystrophic onycholytic nail with subungual debris c/w onychomycosis     Umbilicated papule    Erythematous-base heme-crusted tan verrucoid plaque consistent with inflamed seborrheic keratosis     Erythematous Silvery Scaling Plaque c/w Psoriasis     See annotation      Assessment / Plan:        Nummular eczematous dermatitis vs resolving tinea  (most c/w eczema today)  -     predniSONE (DELTASONE) 20 MG tablet; Take 1 tablet (20 mg total) by mouth once daily. for 15 days  Dispense: 15 tablet; Refill: 0  No hot water  Cetaphil restoraderm after showers  Brochure provided  Dermeleve prn pruritus  Call if recurrent                 Follow up if symptoms worsen or fail to improve.

## 2024-03-12 NOTE — PROGRESS NOTES
Note to patient:  The purpose of this note is to communicate optimally with the other physicians / APCs involved in your care. It is written using standard medical terminology. If you have questions regarding the details of the note, please contact my office to schedule an appointment to address your questions. [employer]  Sports Medicine Office Visit    Rizwan George is a 39 y.o. female (: 1980) presenting to address:  Chief Complaint   Patient presents with    Hip Pain     Left hip     Back Pain    Neck Pain     and left shoulder pain that radiates down back and arm       PCP: Sharon Gamboa MD  Referring provider: Sharon Gamboa MD         Assessment/Plan:     1. Chronic left shoulder pain  Acute on chronic - newly addressed. > check XR  Given known hx of intraarticular pathology and surgery, will check advanced imaging to determine if further interventions are warranted past conservative measures that she is already doing  - XR SHOULDER LT AP/LAT MIN 2 V; Future    2. Chronic left hip pain  Ongoing, pending LEFT hip MRI    3. Chronic bilateral low back pain with bilateral sciatica  Ongoing, pending lumbar MRI  > Continue flexeril QHS    4. ESR raised  5. Ds DNA antibody positive  6. Polyarthralgia  Has established care with rheum, pending further labs and x-rays with probable neck MRI    Orders Placed This Encounter    XR SHOULDER LT AP/LAT MIN 2 V     True glenohumeral AP and outlet Y views please. Standing Status:   Future     Standing Expiration Date:   2022     Order Specific Question:   Is Patient Pregnant? Answer:   No     Order Specific Question:   Reason for Exam     Answer:   shoulder pain     Order Specific Question:   Which facility to perform procedure? Answer:   [facility]    DISCONTD: doxepin (SINEquan) 25 mg capsule     Sig: Take 50 mg by mouth nightly.              Overdue HM items:   Ms. Varinder Mercedes has a reminder for a \"due or due soon\" health maintenance. I have asked that she contact her primary care provider for follow-up on this health maintenance. Management plan & patient instructions discussed with Kristy Acevedo, who voiced understanding. Thank you for the opportunity to participate in the care of this patient. If any questions or concerns at all, please feel free to contact me. This document may have been created with the aid of dictation software. Text may contain errors, particularly phonetic errors. Woo Rose MD  Internal Medicine, Family Medicine & Sports Medicine  5/24/2021    On this date 5/24/2021, I have spent 40 minutes providing care to this patient, which included reviewing the EMR to see if there were any recent visits to the ED, specialists, prior lab or radiology results, obtaining the history from the patient, examining the patient, providing discharge education regarding the diagnosis and counseling on appropriate follow-up, as well as documenting this visit in the EMR. Subjective   History:   Kristy Acevedo is a 39 y.o. female who presents to address:  Chief Complaint   Patient presents with    Hip Pain     Left hip     Back Pain    Neck Pain     and left shoulder pain that radiates down back and arm     last  visit: 3/5/2021      LEFT shoulder pain  Hx of LEFT shoulder \"ablation\" done while in the [employer]. The pain began 2 weeks prior. Denies any precipitating incident or trauma. Did do some yard work the day before. Neurologic symptoms: No numbness, tingling, weakness, bowel or bladder changes. No recent falls      Location: The pain is located in the LEFT shoulder   Radiation: The pain does radiate down arm to dorsum of LEFT forearm. Does NOT involve hand/fingers. Pain Score: Currently: 4/10  At Best: 2/10  At Worst: 6/10  - excruciating and can't get comfortable. Quality: Pain is of a Burning, Pulling and tearing quality.     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 Slava Neff (rheum) this week. Is wondering about a cervical spine MRI. low back pain  Significantly improved after the trial of prednisone after last visit. Then recurred. Has not yet had lumbar spine MRI      B hand pain  Significantly improved on 5th day of prednisone. Has since recurred. LEFT hip pain  Ongoing, unchanged  Has not yet had LEFT hip MRI.    polyarthralgia  Did have first appt with rheumatology. Has upcoming appt to review results, and do x-rays (weren't done at first visit since menses were late, and they were waiting on results of pregnancy test, which was negative)          Past Medical History:   Diagnosis Date    Arthritis     Chronic pain     Depression     GERD (gastroesophageal reflux disease)     Hypercholesterolemia     Thromboembolus ([facility] [address]     Trauma      Past Surgical History:   Procedure Laterality Date    HX CERVICAL DISKECTOMY  2014    C5/6 in [place], [place]    HX CHOLECYSTECTOMY      HX GYN  2011    cyst removal from left ovary    HX HEENT  2008    septo rhinoplasty    HX ORTHOPAEDIC Right 2010    hip for labral tear and ARTURO    HX ORTHOPAEDIC Right 2011    right hip psoas release and clean up    HX ORTHOPAEDIC Left 2011    shoulder ablation    HX TONSILLECTOMY  2009      reports that she quit smoking about 4 months ago. Her smoking use included cigarettes. She has never used smokeless tobacco. She reports current alcohol use of about 2.0 standard drinks of alcohol per week.  She reports that she does not use drugs. Family History   Problem Relation Age of Onset    COPD Mother     Cancer Mother     Elevated Lipids Father     Cancer Sister     Hypertension Maternal Grandmother     Arthritis-rheumatoid Maternal Grandmother     Cancer Maternal Grandfather     Heart Attack Paternal Grandmother     Diabetes Paternal Grandmother     Arthritis-rheumatoid Maternal Aunt 28     No Known Allergies    Problem List:      Patient Active Problem List    Diagnosis    Depression    Chronic neck pain    S/P diskectomy    Chronic back pain    Chronic pain of both knees    Pain in both hands    Arthralgia    Meniere's disease of both ears    Osteoarthritis of spine with radiculopathy, cervical region    H/O cervical spine surgery       Medications:     Current Outpatient Medications on File Prior to Visit   Medication Sig Dispense Refill    cyclobenzaprine (FLEXERIL) 10 mg tablet Take 1 Tab by mouth three (3) times daily as needed for Muscle Spasm(s). 90 Tab 3    meloxicam (Mobic) 15 mg tablet Take 1 Tab by mouth daily as needed for Pain. 90 Tab 1    multivitamin (ONE A DAY) tablet Take 1 Tab by mouth daily.  cholecalciferol (VITAMIN D3) (2,000 UNITS /50 MCG) cap capsule Take 2,000 Units by mouth daily.  omeprazole (PRILOSEC) 40 mg capsule Take 1 Cap by mouth daily. (Patient taking differently: Take 40 mg by mouth daily as needed.) 90 Cap 3    doxepin (SINEquan) 50 mg capsule Take 1 Cap by mouth nightly as needed (for sleep). 90 Cap 1    [DISCONTINUED] doxepin (SINEquan) 25 mg capsule Take 50 mg by mouth nightly.  ergocalciferol (ERGOCALCIFEROL) 1,250 mcg (50,000 unit) capsule Take 1 Cap by mouth every seven (7) days. (Patient not taking: Reported on 5/24/2021) 12 Cap 0     No current facility-administered medications on file prior to visit.        Review of Systems:     Review of Systems       Objective   Physical Assessment:     Vitals:    05/24/21 1322   BP: 109/74   Pulse: (!) 101   Resp: 16 Temp: 97.9 °F (36.6 °C)   TempSrc: Temporal   SpO2: 97%   Weight: 156 lb 9.6 oz (71 kg)   Height: 5' 3.5\" (1.613 m)   PainSc:   4   LMP: 05/19/2021       Physical Exam  Nursing note reviewed. Constitutional:       General: She is not in acute distress. Appearance: She is well-developed. She is not diaphoretic. HENT:      Head: Normocephalic and atraumatic. Comments: Mask in place  Eyes:      Conjunctiva/sclera: Conjunctivae normal.   Musculoskeletal:      Right shoulder: Bony tenderness present. No tenderness. Normal range of motion. Left shoulder: Tenderness (ant GHj line) and bony tenderness present. Decreased range of motion (ff/ER limited to L ear!!; near full ext/IR). Cervical back: Neck supple. Decreased range of motion (flexion, L lateral flexion, L lateral rotation). Skin:     General: Skin is warm and dry. Findings: No rash. Neurological:      Mental Status: She is alert and oriented to person, place, and time. Comments: Decreased sensation to light touch over LEFT C7 and T1 dermatomes   Psychiatric:         Behavior: Behavior normal.         Thought Content: Thought content normal.               Addendum (5/28/2021):         Results from Appointment encounter on 05/24/21    XR SHOULDER LT AP/LAT MIN 2 V    Narrative  XR SHOULDER LT AP/LAT MIN 2 V: 5/24/2021 2:29 PM    CLINICAL INFORMATION  shoulder pain. COMPARISON  None. TECHNIQUE  4 views of the left shoulder    FINDINGS:  No fracture or destructive osseous lesion. Normal alignment. Minimal calcific tendinitis of the distal rotator cuff. Imaged lung parenchyma is clear. Impression  No acute osseous findings. Minimal calcific tendinitis of the distal rotator cuff. XR shows calcific tendinitis. Postoperative changes? Need MRI to determine if surgical consult warranted.      Orders Placed This Encounter    MRI SHOULDER LT WO CONT     Standing Status:   Future     Standing Expiration Date: 6/28/2022     Scheduling Instructions:      [facility]rollara     Order Specific Question:   Is Patient Pregnant?      Answer:   No     Order Specific Question:   Arthrogram study     Answer:   No         Bianca Nova MD  Internal Medicine, Family Medicine & Sports Medicine  5/28/2021 2:40 PM aCse Alvarado

## 2024-08-23 ENCOUNTER — TELEPHONE (OUTPATIENT)
Age: 44
End: 2024-08-23

## 2024-08-23 NOTE — TELEPHONE ENCOUNTER
L/M to speak with patient regarding appointment scheduled in [place] instead of [facility].

## 2025-03-14 NOTE — PROGRESS NOTES
Mimi Elaine is a 44 y.o. female (: 1980) presenting to address:    Chief Complaint   Patient presents with    Shortness of Breath    Fatigue       Vitals:    19 1338   BP: 118/83   Pulse: 76   Resp: 18   Temp: 97.1 °F (36.2 °C)   TempSrc: Oral   SpO2: 99%   Weight: 157 lb 6.4 oz (71.4 kg)   Height: 5' 4.25\" (1.632 m)   PainSc:   0 - No pain   LMP: 2019       Hearing/Vision:   No exam data present    Learning Assessment:     Learning Assessment 2019   PRIMARY LEARNER Patient   HIGHEST LEVEL OF EDUCATION - PRIMARY LEARNER  2 YEARS OF COLLEGE   BARRIERS PRIMARY LEARNER NONE   PRIMARY LANGUAGE ENGLISH    NEED No   LEARNER PREFERENCE PRIMARY DEMONSTRATION   ANSWERED BY patient   RELATIONSHIP SELF     Depression Screening:     3 most recent PHQ Screens 2019   Little interest or pleasure in doing things Not at all   Feeling down, depressed, irritable, or hopeless Not at all   Total Score PHQ 2 0     Fall Risk Assessment:   No flowsheet data found. Abuse Screening:     Abuse Screening Questionnaire 2019   Do you ever feel afraid of your partner? N   Are you in a relationship with someone who physically or mentally threatens you? N   Is it safe for you to go home? Y     Coordination of Care Questionaire:   1. Have you been to the ER, urgent care clinic since your last visit? Hospitalized since your last visit? NO    2. Have you seen or consulted any other health care providers outside of the [address] [place] [address] since your last visit? Include any pap smears or colon screening. YES neurology    Advanced Directive:   1. Do you have an Advanced Directive? NO    2. Would you like information on Advanced Directives?  NO 14-Mar-2025